# Patient Record
Sex: FEMALE | Race: WHITE | Employment: OTHER | URBAN - NONMETROPOLITAN AREA
[De-identification: names, ages, dates, MRNs, and addresses within clinical notes are randomized per-mention and may not be internally consistent; named-entity substitution may affect disease eponyms.]

---

## 2022-11-27 ENCOUNTER — HOSPITAL ENCOUNTER (EMERGENCY)
Age: 62
Discharge: HOME | End: 2022-11-27
Attending: EMERGENCY MEDICINE
Payer: COMMERCIAL

## 2022-11-27 VITALS
TEMPERATURE: 97.7 F | SYSTOLIC BLOOD PRESSURE: 124 MMHG | BODY MASS INDEX: 22.02 KG/M2 | HEIGHT: 64 IN | DIASTOLIC BLOOD PRESSURE: 62 MMHG | HEART RATE: 79 BPM | RESPIRATION RATE: 18 BRPM | WEIGHT: 129 LBS | OXYGEN SATURATION: 96 %

## 2022-11-27 DIAGNOSIS — S91.311A FOOT LACERATION, RIGHT, INITIAL ENCOUNTER: Primary | ICD-10-CM

## 2022-11-27 PROCEDURE — 90471 IMMUNIZATION ADMIN: CPT

## 2022-11-27 PROCEDURE — 75810000293 HC SIMP/SUPERF WND  RPR

## 2022-11-27 PROCEDURE — 90715 TDAP VACCINE 7 YRS/> IM: CPT | Performed by: EMERGENCY MEDICINE

## 2022-11-27 PROCEDURE — 74011250636 HC RX REV CODE- 250/636: Performed by: EMERGENCY MEDICINE

## 2022-11-27 PROCEDURE — 99284 EMERGENCY DEPT VISIT MOD MDM: CPT

## 2022-11-27 RX ORDER — CITALOPRAM 40 MG/1
40 TABLET, FILM COATED ORAL DAILY
COMMUNITY

## 2022-11-27 RX ORDER — ROSUVASTATIN CALCIUM 5 MG/1
5 TABLET, COATED ORAL
COMMUNITY

## 2022-11-27 RX ORDER — TRAMADOL HYDROCHLORIDE 50 MG/1
50 TABLET ORAL
COMMUNITY

## 2022-11-27 RX ADMIN — TETANUS TOXOID, REDUCED DIPHTHERIA TOXOID AND ACELLULAR PERTUSSIS VACCINE, ADSORBED 0.5 ML: 5; 2.5; 8; 8; 2.5 SUSPENSION INTRAMUSCULAR at 17:41

## 2022-11-27 NOTE — ED TRIAGE NOTES
Patient states laceration to right outer foot due to stepping on [employer] ornament. She denies tetanus being up to date.

## 2022-11-27 NOTE — DISCHARGE INSTRUCTIONS
Return for pain, redness/drainage, fever not resolving with motrin or tylenol, shortness of breath, vomiting, decreased fluid intake, weakness, numbness, dizziness, or any change or concerns.

## 2022-11-27 NOTE — ED PROVIDER NOTES
Pt c/o rt foot wound, says cut w metal star xmas decoration just pta. Bleeding resolved w pressure. No other injury. No fb per pt. Mild pain. Last tetanus > 10 yrs pta. Past Medical History:   Diagnosis Date    Arthritis     Rheumatoid arthritis ([address]        Past Surgical History:   Procedure Laterality Date    HX  SECTION      HX HYSTERECTOMY      HX ROTATOR CUFF REPAIR Right          History reviewed. No pertinent family history. Social History     Socioeconomic History    Marital status:      Spouse name: Not on file    Number of children: Not on file    Years of education: Not on file    Highest education level: Not on file   Occupational History    Not on file   Tobacco Use    Smoking status: Every Day     Types: Cigarettes    Smokeless tobacco: Never   Substance and Sexual Activity    Alcohol use: Yes    Drug use: Never    Sexual activity: Not on file   Other Topics Concern    Not on file   Social History Narrative    Not on file     Social Determinants of Health     Financial Resource Strain: Not on file   Food Insecurity: Not on file   Transportation Needs: Not on file   Physical Activity: Not on file   Stress: Not on file   Social Connections: Not on file   Intimate Partner Violence: Not on file   Housing Stability: Not on file         ALLERGIES: Bee venom protein (honey bee)    Review of Systems   Skin:  Positive for wound. Neurological:  Negative for weakness. All other systems reviewed and are negative. Vitals:    22 1709   BP: 124/62   Pulse: 79   Resp: 18   Temp: 97.7 °F (36.5 °C)   SpO2: 96%   Weight: 58.5 kg (129 lb)   Height: 5' 4\" (1.626 m)            Physical Exam  Vitals and nursing note reviewed. Constitutional:       Appearance: She is well-developed. She is not diaphoretic. HENT:      Head: Normocephalic and atraumatic. Eyes:      Conjunctiva/sclera: Conjunctivae normal.   Cardiovascular:      Rate and Rhythm: Normal rate and regular rhythm. Pulses: Normal pulses. Pulmonary:      Effort: Pulmonary effort is normal.      Breath sounds: No wheezing. Musculoskeletal:         General: Normal range of motion. Cervical back: Normal range of motion. Comments: + 1.5 cm linear open wound rt lat prox foot. No bleeding. No tension. No fb. From intact   Skin:     General: Skin is dry. Capillary Refill: Capillary refill takes less than 2 seconds. Findings: No rash. Neurological:      General: No focal deficit present. Mental Status: She is alert. Psychiatric:         Mood and Affect: Mood normal.        MDM         Wound Closure by Adhesive    Date/Time: 11/27/2022 6:14 PM  Performed by: Wallace Meléndez MD  Authorized by: Wallace Meléndez MD     Consent:     Consent obtained:  Verbal    Consent given by:  Patient    Risks discussed:  Poor cosmetic result, infection, pain and poor wound healing    Alternatives discussed: sutures.   Universal protocol:     Patient identity confirmed:  Verbally with patient  Anesthesia:     Anesthesia method:  None  Laceration details:     Location:  Foot    Foot location:  R heel    Length (cm):  1.5    Depth (mm):  3  Exploration:     Hemostasis achieved with:  Direct pressure    Wound exploration: wound explored through full range of motion and entire depth of wound visualized      Wound extent: no nerve damage noted and no tendon damage noted      Contaminated: no    Treatment:     Area cleansed with:  Saline    Amount of cleaning:  Standard    Irrigation solution:  Sterile saline    Irrigation volume:  200    Irrigation method:  Syringe    Debridement:  None  Skin repair:     Repair method:  Tissue adhesive  Approximation:     Approximation:  Close  Repair type:     Repair type:  Simple  Post-procedure details:     Procedure completion:  Tolerated well, no immediate complications      Vitals:  Patient Vitals for the past 12 hrs:   Temp Pulse Resp BP SpO2   11/27/22 1709 97.7 °F (36.5 °C) 79 18 124/62 96 %         Medications ordered:   Medications   diph,Pertuss(AC),Tet Vac-PF (BOOSTRIX) suspension 0.5 mL (0.5 mL IntraMUSCular Given 11/27/22 2504)         Lab findings:  No results found for this or any previous visit (from the past 12 hour(s)). X-Ray, CT or other radiology findings or impressions:  No orders to display             Progress notes, Consult notes or additional Procedure notes:   Offered dermabond vs sutures, pt agrees w dermabond, declines sutures. Declines imaging, says no fx. To close per d/w pt. Det ret inst given  no emc      Diagnosis:   1. Foot laceration, right, initial encounter        Disposition: home    Follow-up Information       Follow up With Specialties Details Why Contact [facility] EMERGENCY DEPT Emergency Medicine Go to  As needed, If symptoms worsen [facility] [address]    Missy Pelaez MD Family Medicine   [address] [facility] [address]  [address]  [phone]               Patient's Medications   Start Taking    No medications on file   Continue Taking    CITALOPRAM (CELEXA) 40 MG TABLET    Take 40 mg by mouth daily. ROSUVASTATIN (CRESTOR) 5 MG TABLET    Take 5 mg by mouth nightly. TRAMADOL (ULTRAM) 50 MG TABLET    Take 50 mg by mouth two (2) times daily as needed for Pain.    These Medications have changed    No medications on file   Stop Taking    No medications on file

## 2022-11-27 NOTE — ED NOTES
Non stick dressing applied over laceration. Dressing secured in place with coban. Patient voiced comfort with procedure.

## 2022-12-03 ENCOUNTER — HOSPITAL ENCOUNTER (EMERGENCY)
Age: 62
Discharge: HOME | End: 2022-12-03
Attending: EMERGENCY MEDICINE
Payer: COMMERCIAL

## 2022-12-03 VITALS
DIASTOLIC BLOOD PRESSURE: 82 MMHG | RESPIRATION RATE: 18 BRPM | OXYGEN SATURATION: 100 % | SYSTOLIC BLOOD PRESSURE: 175 MMHG | BODY MASS INDEX: 22.02 KG/M2 | TEMPERATURE: 97 F | HEART RATE: 89 BPM | HEIGHT: 64 IN | WEIGHT: 129 LBS

## 2022-12-03 DIAGNOSIS — T14.8XXD DELAYED WOUND HEALING: Primary | ICD-10-CM

## 2022-12-03 DIAGNOSIS — L03.115 CELLULITIS OF RIGHT LOWER EXTREMITY: ICD-10-CM

## 2022-12-03 PROCEDURE — 99283 EMERGENCY DEPT VISIT LOW MDM: CPT

## 2022-12-03 PROCEDURE — 74011250637 HC RX REV CODE- 250/637: Performed by: EMERGENCY MEDICINE

## 2022-12-03 PROCEDURE — 74011000250 HC RX REV CODE- 250: Performed by: EMERGENCY MEDICINE

## 2022-12-03 RX ORDER — BACITRACIN 500 UNIT/G
1 PACKET (EA) TOPICAL
Status: COMPLETED | OUTPATIENT
Start: 2022-12-03 | End: 2022-12-03

## 2022-12-03 RX ORDER — ASCORBIC ACID
GRANULES (GRAM) ORAL
COMMUNITY

## 2022-12-03 RX ORDER — TRAZODONE HYDROCHLORIDE 50 MG/1
1 TABLET ORAL
COMMUNITY

## 2022-12-03 RX ORDER — CEPHALEXIN 250 MG/1
500 CAPSULE ORAL
Status: COMPLETED | OUTPATIENT
Start: 2022-12-03 | End: 2022-12-03

## 2022-12-03 RX ORDER — CEPHALEXIN 500 MG/1
500 CAPSULE ORAL 4 TIMES DAILY
Qty: 28 CAPSULE | Refills: 0 | Status: SHIPPED | OUTPATIENT
Start: 2022-12-03 | End: 2022-12-10

## 2022-12-03 RX ORDER — BISACODYL 5 MG/1
TABLET, COATED ORAL
COMMUNITY

## 2022-12-03 RX ADMIN — CEPHALEXIN 500 MG: 250 CAPSULE ORAL at 16:15

## 2022-12-03 RX ADMIN — BACITRACIN 1 PACKET: 500 OINTMENT TOPICAL at 16:14

## 2022-12-03 NOTE — ED PROVIDER NOTES
EMERGENCY DEPARTMENT HISTORY AND PHYSICAL EXAM      Date: 12/3/2022  Patient Name: Christiano Chambers    History of Presenting Illness     Chief Complaint   Patient presents with    Wound Check       History Provided By: Patient    HPI: Christiano Chambers, 65 y.o. female presenting to for evaluation of a laceration to right ankle. Seen and repaired with dermabond 1 week ago but states the dermabond came off the day after repair. Now notes swelling and redness. No numbness. Nmo weakness. No fevers or chills. There are no other complaints, changes, or physical findings at this time. Past History     Past Medical History:  Past Medical History:   Diagnosis Date    Arthritis     Rheumatoid arthritis ([facility] [address]        Past Surgical History:  Past Surgical History:   Procedure Laterality Date    HX  SECTION      HX HYSTERECTOMY      HX ROTATOR CUFF REPAIR Right        Family History:  History reviewed. No pertinent family history. Social History:  Social History     Tobacco Use    Smoking status: Every Day     Types: Cigarettes    Smokeless tobacco: Never   Substance Use Topics    Alcohol use: Yes    Drug use: Never       Allergies: Allergies   Allergen Reactions    Bee Venom Protein (Honey Bee) Anaphylaxis       PCP: Kim Vargas MD    No current facility-administered medications on file prior to encounter. Current Outpatient Medications on File Prior to Encounter   Medication Sig Dispense Refill    multivitamins-minerals-lutein (Multivitamin 50 Plus) tab tablet Take  by mouth. Ascorbic Acid gran Take  by mouth. traZODone (DESYREL) 50 mg tablet Take 1 Tablet by mouth nightly. citalopram (CeleXA) 40 mg tablet Take 40 mg by mouth daily. rosuvastatin (Crestor) 5 mg tablet Take 5 mg by mouth nightly. traMADoL (ULTRAM) 50 mg tablet Take 50 mg by mouth two (2) times daily as needed for Pain. Review of Systems   Review of Systems   Constitutional: Negative.   Negative for activity change. Musculoskeletal:  Positive for myalgias. Negative for arthralgias. Skin:  Positive for wound. Neurological:  Negative for weakness and numbness. All other systems reviewed and are negative. Physical Exam   Physical Exam  Vitals and nursing note reviewed. Constitutional:       Appearance: Normal appearance. HENT:      Head: Normocephalic and atraumatic. Nose: Nose normal.   Eyes:      Conjunctiva/sclera: Conjunctivae normal.   Pulmonary:      Effort: Pulmonary effort is normal.   Musculoskeletal:         General: Normal range of motion. Feet:       Comments: 1.5cm open wound to right lateral ankle. Swelling mild erythmea. Normal sensation. Normal dp and pt pulses. Normal achilles. Neurological:      Mental Status: She is alert. Mental status is at baseline. Psychiatric:         Mood and Affect: Mood normal.         Thought Content: Thought content normal.            Medical Decision Making and ED Course   Differential Diagnosis & Medical Decision Making Provider Note:     Recent laceration, has wound /ulcer that is healing. >48 hours since wound. Unable to close but will cover w abx given early cellulitis. - I am the first provider for this patient. I reviewed the vital signs, available nursing notes, past medical history, past surgical history, family history and social history. The patients presenting problems have been discussed, and they are in agreement with the care plan formulated and outlined with them. I have encouraged them to ask questions as they arise throughout their visit. Vital Signs-Reviewed the patient's vital signs. Patient Vitals for the past 12 hrs:   Temp Pulse Resp BP SpO2   12/03/22 1628 -- -- -- -- 100 %   12/03/22 1539 97 °F (36.1 °C) 89 18 (!) 175/82 99 %       ED Course:           Disposition   Disposition: Condition stable  DC- Adult Discharges: All of the diagnostic tests were reviewed and questions answered.  Diagnosis, care plan and treatment options were discussed. The patient understands the instructions and will follow up as directed. The patients results have been reviewed with them. They have been counseled regarding their diagnosis. The patient verbally convey understanding and agreement of the signs, symptoms, diagnosis, treatment and prognosis and additionally agrees to follow up as recommended with their PCP in 24 - 48 hours. They also agree with the care-plan and convey that all of their questions have been answered. I have also put together some discharge instructions for them that include: 1) educational information regarding their diagnosis, 2) how to care for their diagnosis at home, as well a 3) list of reasons why they would want to return to the ED prior to their follow-up appointment, should their condition change. DC-The patient was given verbal fever treatment  and follow-up instructions  DC- Pain Control DC Home plan: Nonsteroidals and Tylenol     DISCHARGE PLAN:  1. Current Discharge Medication List        START taking these medications    Details   cephALEXin (Keflex) 500 mg capsule Take 1 Capsule by mouth four (4) times daily for 7 days. Qty: 28 Capsule, Refills: 0           CONTINUE these medications which have NOT CHANGED    Details   multivitamins-minerals-lutein (Multivitamin 50 Plus) tab tablet Take  by mouth. Ascorbic Acid gran Take  by mouth. traZODone (DESYREL) 50 mg tablet Take 1 Tablet by mouth nightly. citalopram (CeleXA) 40 mg tablet Take 40 mg by mouth daily. rosuvastatin (Crestor) 5 mg tablet Take 5 mg by mouth nightly. traMADoL (ULTRAM) 50 mg tablet Take 50 mg by mouth two (2) times daily as needed for Pain.            2.   Follow-up Information       Follow up With Specialties Details Why Contact Info    Miranda De Jesus MD Family Medicine Schedule an appointment as soon as possible for a visit in 3 days For followup and recheck of todays symptoms [facility] [address] [facility]  [phone]      [facility] EMERGENCY DEPT Emergency Medicine Go to  As needed, or for any concerns or deteriorations. , if symptoms persist or worsen. [address]  [phone]          3. Return to ED if worse   4. Current Discharge Medication List        START taking these medications    Details   cephALEXin (Keflex) 500 mg capsule Take 1 Capsule by mouth four (4) times daily for 7 days. Qty: 28 Capsule, Refills: 0  Start date: 12/3/2022, End date: 12/10/2022              Diagnosis/Clinical Impression     Clinical Impression:   1. Delayed wound healing    2. Cellulitis of right lower extremity        Attestations: Shakira Rangel MD, am the primary clinician of record. Please note that this dictation was completed with Bench, the [employer] voice recognition software. Quite often unanticipated grammatical, syntax, homophones, and other interpretive errors are inadvertently transcribed by the computer software. Please disregard these errors. Please excuse any errors that have escaped final proofreading. Thank you.

## 2022-12-03 NOTE — ED TRIAGE NOTES
Was here 6 days ago and states that she had a laceration - states that she did not get sutures but got glued - feels like it is infected now. States that it is \"yuck\" now and has increased pain.

## 2022-12-26 ENCOUNTER — HOSPITAL ENCOUNTER (EMERGENCY)
Age: 62
Discharge: HOME | End: 2022-12-26
Attending: EMERGENCY MEDICINE
Payer: COMMERCIAL

## 2022-12-26 ENCOUNTER — APPOINTMENT (OUTPATIENT)
Dept: GENERAL RADIOLOGY | Age: 62
End: 2022-12-26
Attending: EMERGENCY MEDICINE
Payer: COMMERCIAL

## 2022-12-26 VITALS
RESPIRATION RATE: 116 BRPM | OXYGEN SATURATION: 99 % | BODY MASS INDEX: 22.02 KG/M2 | WEIGHT: 129 LBS | DIASTOLIC BLOOD PRESSURE: 67 MMHG | SYSTOLIC BLOOD PRESSURE: 140 MMHG | HEART RATE: 63 BPM | TEMPERATURE: 97.3 F | HEIGHT: 64 IN

## 2022-12-26 DIAGNOSIS — S20.211A CONTUSION OF RIGHT CHEST WALL, INITIAL ENCOUNTER: Primary | ICD-10-CM

## 2022-12-26 LAB
ALBUMIN SERPL-MCNC: 3.9 G/DL (ref 3.4–5)
ALBUMIN/GLOB SERPL: 1.2 {RATIO} (ref 0.8–1.7)
ALP SERPL-CCNC: 65 U/L (ref 45–117)
ALT SERPL-CCNC: 30 U/L (ref 13–56)
ANION GAP SERPL CALC-SCNC: 7 MMOL/L (ref 3–18)
AST SERPL W P-5'-P-CCNC: 15 U/L (ref 10–38)
ATRIAL RATE: 64 BPM
BASOPHILS # BLD: 0 K/UL (ref 0–0.1)
BASOPHILS NFR BLD: 0 % (ref 0–2)
BILIRUB SERPL-MCNC: 0.5 MG/DL (ref 0.2–1)
BUN SERPL-MCNC: 21 MG/DL (ref 7–18)
BUN/CREAT SERPL: 27 (ref 12–20)
CA-I BLD-MCNC: 9.2 MG/DL (ref 8.5–10.1)
CALCULATED P AXIS, ECG09: 37 DEGREES
CALCULATED R AXIS, ECG10: -2 DEGREES
CALCULATED T AXIS, ECG11: -17 DEGREES
CHLORIDE SERPL-SCNC: 105 MMOL/L (ref 100–111)
CO2 SERPL-SCNC: 29 MMOL/L (ref 21–32)
CREAT SERPL-MCNC: 0.79 MG/DL (ref 0.6–1.3)
DIAGNOSIS, 93000: NORMAL
DIFFERENTIAL METHOD BLD: NORMAL
EOSINOPHIL # BLD: 0.2 K/UL (ref 0–0.4)
EOSINOPHIL NFR BLD: 3 % (ref 0–5)
ERYTHROCYTE [DISTWIDTH] IN BLOOD BY AUTOMATED COUNT: 12.7 % (ref 11.6–14.5)
GLOBULIN SER CALC-MCNC: 3.2 G/DL (ref 2–4)
GLUCOSE SERPL-MCNC: 97 MG/DL (ref 74–99)
HCT VFR BLD AUTO: 41.7 % (ref 35–45)
HGB BLD-MCNC: 13.4 G/DL (ref 12–16)
IMM GRANULOCYTES # BLD AUTO: 0 K/UL (ref 0–0.04)
IMM GRANULOCYTES NFR BLD AUTO: 0 % (ref 0–0.5)
INR PPP: 0.9 (ref 0.8–1.2)
LYMPHOCYTES # BLD: 2.2 K/UL (ref 0.9–3.6)
LYMPHOCYTES NFR BLD: 31 % (ref 21–52)
MCH RBC QN AUTO: 29.8 PG (ref 24–34)
MCHC RBC AUTO-ENTMCNC: 32.1 G/DL (ref 31–37)
MCV RBC AUTO: 92.7 FL (ref 78–100)
MONOCYTES # BLD: 0.4 K/UL (ref 0.05–1.2)
MONOCYTES NFR BLD: 6 % (ref 3–10)
NEUTS SEG # BLD: 4.1 K/UL (ref 1.8–8)
NEUTS SEG NFR BLD: 60 % (ref 40–73)
NRBC # BLD: 0 K/UL (ref 0–0.01)
NRBC BLD-RTO: 0 PER 100 WBC
P-R INTERVAL, ECG05: 179 MS
PLATELET # BLD AUTO: 262 K/UL (ref 135–420)
PMV BLD AUTO: 9.4 FL (ref 9.2–11.8)
POTASSIUM SERPL-SCNC: 4 MMOL/L (ref 3.5–5.5)
PROT SERPL-MCNC: 7.1 G/DL (ref 6.4–8.2)
PROTHROMBIN TIME: 13 SEC (ref 11.5–15.2)
Q-T INTERVAL, ECG07: 434 MS
QRS DURATION, ECG06: 97 MS
QTC CALCULATION (BEZET), ECG08: 445 MS
RBC # BLD AUTO: 4.5 M/UL (ref 4.2–5.3)
SODIUM SERPL-SCNC: 141 MMOL/L (ref 136–145)
VENTRICULAR RATE, ECG03: 63 BPM
WBC # BLD AUTO: 6.9 K/UL (ref 4.6–13.2)

## 2022-12-26 PROCEDURE — 96374 THER/PROPH/DIAG INJ IV PUSH: CPT

## 2022-12-26 PROCEDURE — 85025 COMPLETE CBC W/AUTO DIFF WBC: CPT

## 2022-12-26 PROCEDURE — 85610 PROTHROMBIN TIME: CPT

## 2022-12-26 PROCEDURE — 80053 COMPREHEN METABOLIC PANEL: CPT

## 2022-12-26 PROCEDURE — 74011250636 HC RX REV CODE- 250/636: Performed by: EMERGENCY MEDICINE

## 2022-12-26 PROCEDURE — 96375 TX/PRO/DX INJ NEW DRUG ADDON: CPT

## 2022-12-26 PROCEDURE — 71101 X-RAY EXAM UNILAT RIBS/CHEST: CPT

## 2022-12-26 PROCEDURE — 99285 EMERGENCY DEPT VISIT HI MDM: CPT

## 2022-12-26 PROCEDURE — 93005 ELECTROCARDIOGRAM TRACING: CPT

## 2022-12-26 RX ORDER — TRAMADOL HYDROCHLORIDE 50 MG/1
50 TABLET ORAL
Qty: 10 TABLET | Refills: 0 | Status: SHIPPED | OUTPATIENT
Start: 2022-12-26 | End: 2022-12-29

## 2022-12-26 RX ORDER — SODIUM CHLORIDE 0.9 % (FLUSH) 0.9 %
5-40 SYRINGE (ML) INJECTION EVERY 8 HOURS
Status: DISCONTINUED | OUTPATIENT
Start: 2022-12-26 | End: 2022-12-26 | Stop reason: HOSPADM

## 2022-12-26 RX ORDER — MORPHINE SULFATE 4 MG/ML
4 INJECTION, SOLUTION INTRAMUSCULAR; INTRAVENOUS
Status: COMPLETED | OUTPATIENT
Start: 2022-12-26 | End: 2022-12-26

## 2022-12-26 RX ORDER — NAPROXEN 500 MG/1
500 TABLET ORAL
Qty: 20 TABLET | Refills: 0 | Status: SHIPPED | OUTPATIENT
Start: 2022-12-26

## 2022-12-26 RX ORDER — ONDANSETRON 2 MG/ML
4 INJECTION INTRAMUSCULAR; INTRAVENOUS
Status: COMPLETED | OUTPATIENT
Start: 2022-12-26 | End: 2022-12-26

## 2022-12-26 RX ORDER — SODIUM CHLORIDE 0.9 % (FLUSH) 0.9 %
5-40 SYRINGE (ML) INJECTION AS NEEDED
Status: DISCONTINUED | OUTPATIENT
Start: 2022-12-26 | End: 2022-12-26 | Stop reason: HOSPADM

## 2022-12-26 RX ADMIN — MORPHINE SULFATE 4 MG: 4 INJECTION, SOLUTION INTRAMUSCULAR; INTRAVENOUS at 12:34

## 2022-12-26 RX ADMIN — SODIUM CHLORIDE 1000 ML: 9 INJECTION, SOLUTION INTRAVENOUS at 12:20

## 2022-12-26 RX ADMIN — ONDANSETRON 4 MG: 2 INJECTION INTRAMUSCULAR; INTRAVENOUS at 12:34

## 2022-12-26 NOTE — ED PROVIDER NOTES
EMERGENCY DEPARTMENT HISTORY AND PHYSICAL EXAM      Date: 2022  Patient Name: Frances Grady    History of Presenting Illness     Chief Complaint   Patient presents with    Fall       History Provided By: Patient and Patient's Daughter    HPI: Frances Grady, 65 y.o. female with known past medical history significant for rheumatoid arthritis presents with dizzy spells over the past several weeks leading to a fall last night where she hit the right side of her ribs. There are no exacerbating or relieving factors for the dizzy spells and she has no explanation has not seen a physician for them. She is accompanied by her daughter conveys that they have treated the right rib pain with over-the-counter remedies without complete relief of her symptoms. She denies any loss of consciousness and is not on a blood thinning medication    There are no other complaints, changes, or physical findings at this time. Past History   Past Medical History:  Past Medical History:   Diagnosis Date    Arthritis     Rheumatoid arthritis ([address]        Past Surgical History:  Past Surgical History:   Procedure Laterality Date    HX  SECTION      HX HYSTERECTOMY      HX ROTATOR CUFF REPAIR Right        Family History:  History reviewed. No pertinent family history. Social History:  Social History     Tobacco Use    Smoking status: Every Day     Types: Cigarettes    Smokeless tobacco: Never   Substance Use Topics    Alcohol use: Yes    Drug use: Never       Allergies:   Allergies   Allergen Reactions    Bee Venom Protein (Honey Bee) Anaphylaxis       PCP: Zacarias Parr MD    Current Facility-Administered Medications   Medication Dose Route Frequency Provider Last Rate Last Admin    sodium chloride (NS) flush 5-40 mL  5-40 mL IntraVENous Q8H Carlos ELIZABETH MD        sodium chloride (NS) flush 5-40 mL  5-40 mL IntraVENous PRN Mayito Hogan MD         Current Outpatient Medications   Medication Sig Dispense Refill    traMADoL (ULTRAM) 50 mg tablet Take 1 Tablet by mouth every six (6) hours as needed for Pain for up to 3 days. Max Daily Amount: 200 mg. Indications: pain 10 Tablet 0    naproxen (NAPROSYN) 500 mg tablet Take 1 Tablet by mouth every twelve (12) hours as needed for Pain. 20 Tablet 0    traZODone (DESYREL) 50 mg tablet Take 1 Tablet by mouth nightly. citalopram (CELEXA) 40 mg tablet Take 40 mg by mouth daily. rosuvastatin (CRESTOR) 5 mg tablet Take 5 mg by mouth nightly. Review of Systems   Review of Systems   Constitutional: Negative. Negative for appetite change, chills, fatigue and fever. HENT: Negative. Negative for congestion and sinus pain. Eyes: Negative. Negative for pain and visual disturbance. Respiratory:  Positive for chest tightness. Negative for shortness of breath. Cardiovascular:  Positive for chest pain. Gastrointestinal: Negative. Negative for abdominal pain, diarrhea, nausea and vomiting. Genitourinary: Negative. Negative for difficulty urinating. No discharge   Musculoskeletal:  Positive for back pain. Negative for arthralgias. Skin: Negative. Negative for rash. Neurological: Negative. Negative for weakness and headaches. Hematological: Negative. Psychiatric/Behavioral: Negative. Negative for agitation. The patient is not nervous/anxious. All other systems reviewed and are negative. Physical Exam   Physical Exam  Vitals and nursing note reviewed. Constitutional:       General: She is not in acute distress. Appearance: She is well-developed. HENT:      Head: Normocephalic and atraumatic. Nose: Nose normal.      Mouth/Throat:      Mouth: Mucous membranes are moist.      Pharynx: Oropharynx is clear. No oropharyngeal exudate. Eyes:      General:         Right eye: No discharge. Left eye: No discharge. Conjunctiva/sclera: Conjunctivae normal.      Pupils: Pupils are equal, round, and reactive to light. Cardiovascular:      Rate and Rhythm: Normal rate and regular rhythm. Chest Wall: PMI is not displaced. No thrill. Heart sounds: Normal heart sounds. No murmur heard. No friction rub. No gallop. Pulmonary:      Effort: Pulmonary effort is normal. No respiratory distress. Breath sounds: Normal breath sounds. No wheezing or rales. Chest:      Chest wall: No tenderness. Abdominal:      General: Bowel sounds are normal. There is no distension. Palpations: Abdomen is soft. There is no mass. Tenderness: There is no abdominal tenderness. There is no guarding or rebound. Musculoskeletal:         General: Normal range of motion. Cervical back: Normal range of motion and neck supple. Comments: Tender to palpation over right mid axillary line rib cage as well as right mid scapular line rib pain   Lymphadenopathy:      Cervical: No cervical adenopathy. Skin:     General: Skin is warm and dry. Capillary Refill: Capillary refill takes less than 2 seconds. Findings: No erythema or rash. Neurological:      Mental Status: She is alert and oriented to person, place, and time. Cranial Nerves: No cranial nerve deficit. Coordination: Coordination normal.   Psychiatric:         Mood and Affect: Mood normal.         Behavior: Behavior normal.       Lab and Diagnostic Study Results   Labs -     Recent Results (from the past 12 hour(s))   CBC WITH AUTOMATED DIFF    Collection Time: 12/26/22 12:25 PM   Result Value Ref Range    WBC 6.9 4.6 - 13.2 K/uL    RBC 4.50 4. 20 - 5.30 M/uL    HGB 13.4 12.0 - 16.0 g/dL    HCT 41.7 35.0 - 45.0 %    MCV 92.7 78.0 - 100.0 FL    MCH 29.8 24.0 - 34.0 PG    MCHC 32.1 31.0 - 37.0 g/dL    RDW 12.7 11.6 - 14.5 %    PLATELET 923 271 - 897 K/uL    MPV 9.4 9.2 - 11.8 FL    NRBC 0.0 0.0  WBC    ABSOLUTE NRBC 0.00 0.00 - 0.01 K/uL    NEUTROPHILS 60 40 - 73 %    LYMPHOCYTES 31 21 - 52 %    MONOCYTES 6 3 - 10 %    EOSINOPHILS 3 0 - 5 % BASOPHILS 0 0 - 2 %    IMMATURE GRANULOCYTES 0 0 - 0.5 %    ABS. NEUTROPHILS 4.1 1.8 - 8.0 K/UL    ABS. LYMPHOCYTES 2.2 0.9 - 3.6 K/UL    ABS. MONOCYTES 0.4 0.05 - 1.2 K/UL    ABS. EOSINOPHILS 0.2 0.0 - 0.4 K/UL    ABS. BASOPHILS 0.0 0.0 - 0.1 K/UL    ABS. IMM. GRANS. 0.0 0.00 - 0.04 K/UL    DF AUTOMATED     METABOLIC PANEL, COMPREHENSIVE    Collection Time: 12/26/22 12:25 PM   Result Value Ref Range    Sodium 141 136 - 145 mmol/L    Potassium 4.0 3.5 - 5.5 mmol/L    Chloride 105 100 - 111 mmol/L    CO2 29 21 - 32 mmol/L    Anion gap 7 3.0 - 18.0 mmol/L    Glucose 97 74 - 99 mg/dL    BUN 21 (H) 7 - 18 mg/dL    Creatinine 0.79 0.60 - 1.30 mg/dL    BUN/Creatinine ratio 27 (H) 12 - 20      eGFR >60 >60 ml/min/1.73m2    Calcium 9.2 8.5 - 10.1 mg/dL    Bilirubin, total 0.5 0.2 - 1.0 mg/dL    AST (SGOT) 15 10 - 38 U/L    ALT (SGPT) 30 13 - 56 U/L    Alk. phosphatase 65 45 - 117 U/L    Protein, total 7.1 6.4 - 8.2 g/dL    Albumin 3.9 3.4 - 5.0 g/dL    Globulin 3.2 2.0 - 4.0 g/dL    A-G Ratio 1.2 0.8 - 1.7     PROTHROMBIN TIME + INR    Collection Time: 12/26/22 12:25 PM   Result Value Ref Range    Prothrombin time 13.0 11.5 - 15.2 sec    INR 0.9 0.8 - 1.2         Radiologic Studies -   [unfilled]  CT Results  (Last 48 hours)      None          CXR Results  (Last 48 hours)      None            Medical Decision Making and ED Course   Differential Diagnosis & Medical Decision Making Provider Note:   Dizzy spells could likely be ACS, arrhythmia, dehydration, electrolyte abnormality, UTI. Patient is tachycardic at this time likely secondary to pain. Will treat with analgesic and assess with basic and cardiac labs and treat with IV fluid. Will perform orthostatic vitals and assess UA. Additionally will perform right rib series for diagnostics for acute chest pathology. - I am the first and primary provider for this patient.  I reviewed the vital signs, available nursing notes, past medical history, past surgical history, family history and social history. The patient's presenting problems have been discussed, and the staff are in agreement with the care plan formulated and outlined with them. I have encouraged them to ask questions as they arise throughout their visit. Vital Signs-Reviewed the patient's vital signs. Patient Vitals for the past 12 hrs:   Temp Pulse Resp BP SpO2   12/26/22 1250 -- 63 -- (!) 140/67 --   12/26/22 1250 -- 63 -- (!) 147/80 --   12/26/22 1249 -- 60 -- 139/73 --   12/26/22 1158 -- -- -- (!) 151/77 --   12/26/22 1152 97.3 °F (36.3 °C) 72 (!) 116 -- 99 %       EKG interpretation: (Preliminary): EKG Interpreted by me. Shows rate 63.  179 ms, QRS 79 ms,  ms. Interpretation: Sinus rhythm. ED Course:   No evidence of acute fracture or pulmonary contusion. No evidence for reasons why the patient would be passing out. Procedures and Critical Care     Disposition   Disposition: Condition stable    DISCHARGE PLAN:  1. Current Discharge Medication List        CONTINUE these medications which have NOT CHANGED    Details   traZODone (DESYREL) 50 mg tablet Take 1 Tablet by mouth nightly. citalopram (CELEXA) 40 mg tablet Take 40 mg by mouth daily. rosuvastatin (CRESTOR) 5 mg tablet Take 5 mg by mouth nightly. traMADoL (ULTRAM) 50 mg tablet Take 50 mg by mouth two (2) times daily as needed for Pain. 2.   Follow-up Information       Follow up With Specialties Details Why Contact Info    Tavon Pardo MD Family Medicine Call in 2 days  [address] [facility]  [phone]            3. Return to ED if worse   4. Current Discharge Medication List        START taking these medications    Details   traMADoL (ULTRAM) 50 mg tablet Take 1 Tablet by mouth every six (6) hours as needed for Pain for up to 3 days. Max Daily Amount: 200 mg.  Indications: pain  Qty: 10 Tablet, Refills: 0  Start date: 12/26/2022, End date: 12/29/2022    Associated Diagnoses: Contusion of right chest wall, initial encounter      naproxen (NAPROSYN) 500 mg tablet Take 1 Tablet by mouth every twelve (12) hours as needed for Pain. Qty: 20 Tablet, Refills: 0  Start date: 12/26/2022           Remove if admitted/transferred    Diagnosis/Clinical Impression     Clinical Impression:   1. Contusion of right chest wall, initial encounter        Attestations: Joo Shine MD, am the primary clinician of record. Please note that this dictation was completed with [employer], the [employer] voice recognition software. Quite often unanticipated grammatical, syntax, homophones, and other interpretive errors are inadvertently transcribed by the computer software. Please disregard these errors. Please excuse any errors that have escaped final proofreading. Thank you.

## 2023-09-09 ENCOUNTER — APPOINTMENT (OUTPATIENT)
Age: 63
End: 2023-09-09
Payer: COMMERCIAL

## 2023-09-09 ENCOUNTER — HOSPITAL ENCOUNTER (EMERGENCY)
Age: 63
Discharge: ANOTHER HOSPITAL | End: 2023-09-09
Attending: EMERGENCY MEDICINE
Payer: COMMERCIAL

## 2023-09-09 VITALS
SYSTOLIC BLOOD PRESSURE: 126 MMHG | WEIGHT: 141 LBS | DIASTOLIC BLOOD PRESSURE: 73 MMHG | HEART RATE: 71 BPM | TEMPERATURE: 98.3 F | BODY MASS INDEX: 24.2 KG/M2 | RESPIRATION RATE: 20 BRPM | OXYGEN SATURATION: 100 %

## 2023-09-09 DIAGNOSIS — S22.41XA CLOSED FRACTURE OF MULTIPLE RIBS OF RIGHT SIDE, INITIAL ENCOUNTER: ICD-10-CM

## 2023-09-09 DIAGNOSIS — S27.0XXA TRAUMATIC PNEUMOTHORAX, INITIAL ENCOUNTER: Primary | ICD-10-CM

## 2023-09-09 DIAGNOSIS — T79.7XXA TRAUMATIC SUBCUTANEOUS EMPHYSEMA, INITIAL ENCOUNTER (HCC): ICD-10-CM

## 2023-09-09 LAB
ALBUMIN SERPL-MCNC: 3.8 G/DL (ref 3.4–5)
ALBUMIN/GLOB SERPL: 1.4 (ref 0.8–1.7)
ALP SERPL-CCNC: 59 U/L (ref 45–117)
ALT SERPL-CCNC: 37 U/L (ref 13–56)
ANION GAP SERPL CALC-SCNC: 6 MMOL/L (ref 3–18)
AST SERPL W P-5'-P-CCNC: 22 U/L (ref 10–38)
BASOPHILS # BLD: 0 K/UL (ref 0–0.1)
BASOPHILS NFR BLD: 0 % (ref 0–2)
BILIRUB SERPL-MCNC: 0.3 MG/DL (ref 0.2–1)
BUN SERPL-MCNC: 11 MG/DL (ref 7–18)
BUN/CREAT SERPL: 16 (ref 12–20)
CA-I BLD-MCNC: 7.8 MG/DL (ref 8.5–10.1)
CHLORIDE SERPL-SCNC: 106 MMOL/L (ref 100–111)
CO2 SERPL-SCNC: 28 MMOL/L (ref 21–32)
CREAT SERPL-MCNC: 0.67 MG/DL (ref 0.6–1.3)
DIFFERENTIAL METHOD BLD: ABNORMAL
EOSINOPHIL # BLD: 0 K/UL (ref 0–0.4)
EOSINOPHIL NFR BLD: 0 % (ref 0–5)
ERYTHROCYTE [DISTWIDTH] IN BLOOD BY AUTOMATED COUNT: 12.7 % (ref 11.6–14.5)
ETHANOL SERPL-MCNC: 145 MG/DL (ref 0–3)
GLOBULIN SER CALC-MCNC: 2.8 G/DL (ref 2–4)
GLUCOSE SERPL-MCNC: 99 MG/DL (ref 74–99)
HCT VFR BLD AUTO: 38.8 % (ref 35–45)
HGB BLD-MCNC: 12.8 G/DL (ref 12–16)
IMM GRANULOCYTES # BLD AUTO: 0.1 K/UL (ref 0–0.04)
IMM GRANULOCYTES NFR BLD AUTO: 0 % (ref 0–0.5)
LYMPHOCYTES # BLD: 1.5 K/UL (ref 0.9–3.6)
LYMPHOCYTES NFR BLD: 12 % (ref 21–52)
MCH RBC QN AUTO: 30.3 PG (ref 24–34)
MCHC RBC AUTO-ENTMCNC: 33 G/DL (ref 31–37)
MCV RBC AUTO: 91.7 FL (ref 78–100)
MONOCYTES # BLD: 0.4 K/UL (ref 0.05–1.2)
MONOCYTES NFR BLD: 4 % (ref 3–10)
NEUTS SEG # BLD: 10.5 K/UL (ref 1.8–8)
NEUTS SEG NFR BLD: 84 % (ref 40–73)
NRBC # BLD: 0 K/UL (ref 0–0.01)
NRBC BLD-RTO: 0 PER 100 WBC
PLATELET # BLD AUTO: 226 K/UL (ref 135–420)
PMV BLD AUTO: 9.6 FL (ref 9.2–11.8)
POTASSIUM SERPL-SCNC: 3.6 MMOL/L (ref 3.5–5.5)
PROT SERPL-MCNC: 6.6 G/DL (ref 6.4–8.2)
RBC # BLD AUTO: 4.23 M/UL (ref 4.2–5.3)
SODIUM SERPL-SCNC: 140 MMOL/L (ref 136–145)
WBC # BLD AUTO: 12.6 K/UL (ref 4.6–13.2)

## 2023-09-09 PROCEDURE — 99285 EMERGENCY DEPT VISIT HI MDM: CPT

## 2023-09-09 PROCEDURE — 71250 CT THORAX DX C-: CPT

## 2023-09-09 PROCEDURE — 36415 COLL VENOUS BLD VENIPUNCTURE: CPT

## 2023-09-09 PROCEDURE — 6360000002 HC RX W HCPCS: Performed by: EMERGENCY MEDICINE

## 2023-09-09 PROCEDURE — 96375 TX/PRO/DX INJ NEW DRUG ADDON: CPT

## 2023-09-09 PROCEDURE — 2580000003 HC RX 258: Performed by: EMERGENCY MEDICINE

## 2023-09-09 PROCEDURE — 2500000003 HC RX 250 WO HCPCS: Performed by: EMERGENCY MEDICINE

## 2023-09-09 PROCEDURE — 82077 ASSAY SPEC XCP UR&BREATH IA: CPT

## 2023-09-09 PROCEDURE — 80053 COMPREHEN METABOLIC PANEL: CPT

## 2023-09-09 PROCEDURE — 96374 THER/PROPH/DIAG INJ IV PUSH: CPT

## 2023-09-09 PROCEDURE — 85025 COMPLETE CBC W/AUTO DIFF WBC: CPT

## 2023-09-09 RX ORDER — MORPHINE SULFATE 4 MG/ML
4 INJECTION, SOLUTION INTRAMUSCULAR; INTRAVENOUS
Status: COMPLETED | OUTPATIENT
Start: 2023-09-09 | End: 2023-09-09

## 2023-09-09 RX ORDER — 0.9 % SODIUM CHLORIDE 0.9 %
1000 INTRAVENOUS SOLUTION INTRAVENOUS ONCE
Status: COMPLETED | OUTPATIENT
Start: 2023-09-09 | End: 2023-09-09

## 2023-09-09 RX ORDER — ONDANSETRON 2 MG/ML
4 INJECTION INTRAMUSCULAR; INTRAVENOUS ONCE
Status: COMPLETED | OUTPATIENT
Start: 2023-09-09 | End: 2023-09-09

## 2023-09-09 RX ORDER — HYDROMORPHONE HYDROCHLORIDE 1 MG/ML
1 INJECTION, SOLUTION INTRAMUSCULAR; INTRAVENOUS; SUBCUTANEOUS
Status: COMPLETED | OUTPATIENT
Start: 2023-09-09 | End: 2023-09-09

## 2023-09-09 RX ADMIN — ONDANSETRON 4 MG: 2 INJECTION INTRAMUSCULAR; INTRAVENOUS at 02:53

## 2023-09-09 RX ADMIN — SODIUM CHLORIDE 1000 ML: 9 INJECTION, SOLUTION INTRAVENOUS at 02:03

## 2023-09-09 RX ADMIN — MORPHINE SULFATE 4 MG: 4 INJECTION, SOLUTION INTRAMUSCULAR; INTRAVENOUS at 02:03

## 2023-09-09 RX ADMIN — HYDROMORPHONE HYDROCHLORIDE 1 MG: 1 INJECTION, SOLUTION INTRAMUSCULAR; INTRAVENOUS; SUBCUTANEOUS at 02:54

## 2023-09-09 RX ADMIN — HYDROMORPHONE HYDROCHLORIDE 1 MG: 1 INJECTION, SOLUTION INTRAMUSCULAR; INTRAVENOUS; SUBCUTANEOUS at 04:24

## 2023-09-09 ASSESSMENT — PAIN SCALES - GENERAL: PAINLEVEL_OUTOF10: 6

## 2023-09-09 ASSESSMENT — PAIN DESCRIPTION - DESCRIPTORS: DESCRIPTORS: ACHING

## 2023-09-09 ASSESSMENT — PAIN DESCRIPTION - LOCATION: LOCATION: CHEST;HEAD

## 2023-09-09 NOTE — ED NOTES
Pt arrived at [facility]. See Downtime forms for any additional or previous information. System Downtime Recovery  The EMR experienced a system downtime. This downtime occurred on September 9 at 0100 for a duration of 2 hour(s). During this downtime paper charting was completed by me. The following was documented on paper during the downtime and is now being back-entered. The following is remaining on paper and will be scanned into Epic: triage notes, triage assessment, medication administration.             Alex Lam RN  09/09/23 5042

## 2023-09-09 NOTE — ED PROVIDER NOTES
EMERGENCY DEPARTMENT HISTORY AND PHYSICAL EXAM      Patient Name: Kym Villegas  MRN: [id number]  YOB: 1960  Provider: Kyler Byrne DO  PCP: Malik Garcia MD   Time/Date of evaluation: 3:44 AM EDT on 23    History of Presenting Illness     No chief complaint on file. History Provided By: EMS and Patient     History Dean Hameed): Kym Villegas is a 59 y.o. female with a PMHX of rheumatoid arthritis and hyperlipidemia  who presents to the emergency department  by EMS C/O fall and right-sided rib pain onset 2 hours prior to arrival.  Patient states that she was outside and going back into the house to get something to drink when her Rottweiler got tangled up in her feet. Patient states that she fell onto her right rib area. Patient states that she had immediate intense pain on the right ribs. Patient denies hitting her head. Patient denies any loss conscious. Patient denies any neck or head pain. Patient states that she is not on any blood thinners. Patient states that she was unable to get up due to the pain. Patient states that anytime she takes a breath or moves it hurts on the right side. Patient states that she had had a couple of drinks earlier in the evening prior to this incident. Past History     Past Medical History:  Past Medical History:   Diagnosis Date    Arthritis     Rheumatoid arthritis ([address])        Past Surgical History:  Past Surgical History:   Procedure Laterality Date     SECTION      HYSTERECTOMY (CERVIX STATUS UNKNOWN)      ROTATOR CUFF REPAIR Right        Family History:  No family history on file. Social History:  Social History     Tobacco Use    Smoking status: Every Day    Smokeless tobacco: Never   Substance Use Topics    Alcohol use: Yes    Drug use: Never       Medications:  No current facility-administered medications for this encounter.      Current Outpatient Medications   Medication Sig Dispense Refill    citalopram (CELEXA) (DILAUDID) injection 1 mg (1 mg IntraVENous Given 9/9/23 0254)   morphine injection 4 mg (4 mg IntraVENous Given 9/9/23 0203)   ondansetron (ZOFRAN) injection 4 mg (4 mg IntraVENous Given 9/9/23 0253)            Medical Decision Making     CC/HPI Summary, DDx, ED Course, and Reassessment: This is a 81-year-old female who is brought to the emergency room with chief complaint of fall and right rib pain. Differential diagnosis includes rib fracture, rib contusion, pulmonary contusion, pneumothorax. Discussed results plan with patient. Patient has multiple rib fractures with small to moderate pneumothorax. Patient will need to be seen by trauma. I spoke with Dr. Hanane Hudson at [address] [place] [address] ED. She accepts the patient in transfer. Patient has remained stable throughout her time in the emergency room. Disposition Considerations (tests considered but not done, Admit vs D/C, Shared Decision Making, Pt Expectation of Test or Tx.): Considered other imaging of head and cervical spine. However, trauma services will do this once the patient arrives at the trauma center. Critical Care Time:     35 minutes    Nayla Singh DO    Diagnosis and Disposition     DIAGNOSIS:   1. Traumatic pneumothorax, initial encounter    2. Closed fracture of multiple ribs of right side, initial encounter    3. Traumatic subcutaneous emphysema, initial encounter [facility])        DISPOSITION Decision To Transfer 09/09/2023 03:24:03 AM      PATIENT REFERRED TO:  [address] [place] [address] ED for trauma evaluation. (Please note that portions of this note were completed with a voice recognition program.  Efforts were made to edit the dictations but occasionally words are mis-transcribed.)    Nayla Singh DO (electronically signed)    INayla DO, am the primary clinician of record. Dragon Disclaimer     Please note that this dictation was completed with [employer], the computer voice recognition software.   Quite often unanticipated grammatical,

## 2023-09-09 NOTE — ED NOTES
Per Transfer center flight has declined due to visibility. [facility] called and eta is 45 minutes.       Damien Olsen RN  09/09/23 4772

## 2023-09-09 NOTE — ED NOTES
Pt received 4 mg morphine IV and Bolus of normal saline per Dr Robina Buckner order. Documented on downtime forms.       Alex Lam RN  09/09/23 1775

## 2023-09-09 NOTE — ED TRIAGE NOTES
Pt arrived via ems after a ground level fall in her driveway. Pt complaining of R upper flank pain with pain mainly around her R lower ribs. Tender upon palpation. Some crepitous felt. PT awake, alert, oriented x4. Denies LOC or hitting her head. Denies being on blood thinners. Pt vitals stable and sats above 97 percent on room air. Pt given 100mcg fentanyl by ems.

## 2024-09-12 ENCOUNTER — APPOINTMENT (OUTPATIENT)
Age: 64
End: 2024-09-12
Payer: COMMERCIAL

## 2024-09-12 ENCOUNTER — HOSPITAL ENCOUNTER (OUTPATIENT)
Age: 64
Setting detail: OBSERVATION
Discharge: ANOTHER HOSPITAL | End: 2024-09-13
Attending: FAMILY MEDICINE | Admitting: INTERNAL MEDICINE
Payer: COMMERCIAL

## 2024-09-12 DIAGNOSIS — E87.6 HYPOKALEMIA: ICD-10-CM

## 2024-09-12 DIAGNOSIS — M54.16 LUMBAR RADICULOPATHY, ACUTE: ICD-10-CM

## 2024-09-12 DIAGNOSIS — M51.26 HERNIATED LUMBAR INTERVERTEBRAL DISC: Primary | ICD-10-CM

## 2024-09-12 LAB
ALBUMIN SERPL-MCNC: 4.6 G/DL (ref 3.4–5)
ALBUMIN/GLOB SERPL: 1.4 (ref 0.8–1.7)
ALP SERPL-CCNC: 67 U/L (ref 45–117)
ALT SERPL-CCNC: 30 U/L (ref 13–56)
ANION GAP SERPL CALC-SCNC: 7 MMOL/L (ref 3–18)
APPEARANCE UR: CLEAR
AST SERPL W P-5'-P-CCNC: 16 U/L (ref 10–38)
BACTERIA URNS QL MICRO: ABNORMAL /HPF
BASOPHILS # BLD: 0 K/UL (ref 0–0.1)
BASOPHILS NFR BLD: 0 % (ref 0–2)
BILIRUB SERPL-MCNC: 0.5 MG/DL (ref 0.2–1)
BILIRUB UR QL: NEGATIVE
BUN SERPL-MCNC: 14 MG/DL (ref 7–18)
BUN/CREAT SERPL: 18 (ref 12–20)
CA-I BLD-MCNC: 9.7 MG/DL (ref 8.5–10.1)
CHLORIDE SERPL-SCNC: 105 MMOL/L (ref 100–111)
CK SERPL-CCNC: 116 U/L (ref 26–192)
CO2 SERPL-SCNC: 30 MMOL/L (ref 21–32)
COLOR UR: YELLOW
CREAT SERPL-MCNC: 0.77 MG/DL (ref 0.6–1.3)
CRP SERPL-MCNC: <0.3 MG/DL (ref 0–0.3)
DIFFERENTIAL METHOD BLD: NORMAL
EOSINOPHIL # BLD: 0 K/UL (ref 0–0.4)
EOSINOPHIL NFR BLD: 0 % (ref 0–5)
EPITH CASTS URNS QL MICRO: ABNORMAL /LPF (ref 0–20)
ERYTHROCYTE [DISTWIDTH] IN BLOOD BY AUTOMATED COUNT: 12.7 % (ref 11.6–14.5)
ERYTHROCYTE [SEDIMENTATION RATE] IN BLOOD: 17 MM/HR (ref 0–30)
GLOBULIN SER CALC-MCNC: 3.3 G/DL (ref 2–4)
GLUCOSE SERPL-MCNC: 115 MG/DL (ref 74–99)
GLUCOSE UR STRIP.AUTO-MCNC: NEGATIVE MG/DL
HCT VFR BLD AUTO: 43.1 % (ref 35–45)
HGB BLD-MCNC: 14.3 G/DL (ref 12–16)
HGB UR QL STRIP: ABNORMAL
IMM GRANULOCYTES # BLD AUTO: 0 K/UL (ref 0–0.04)
IMM GRANULOCYTES NFR BLD AUTO: 0 % (ref 0–0.5)
KETONES UR QL STRIP.AUTO: NEGATIVE MG/DL
LEUKOCYTE ESTERASE UR QL STRIP.AUTO: NEGATIVE
LYMPHOCYTES # BLD: 2.6 K/UL (ref 0.9–3.6)
LYMPHOCYTES NFR BLD: 24 % (ref 21–52)
MAGNESIUM SERPL-MCNC: 2.1 MG/DL (ref 1.6–2.6)
MCH RBC QN AUTO: 29.5 PG (ref 24–34)
MCHC RBC AUTO-ENTMCNC: 33.2 G/DL (ref 31–37)
MCV RBC AUTO: 88.9 FL (ref 78–100)
MONOCYTES # BLD: 0.7 K/UL (ref 0.05–1.2)
MONOCYTES NFR BLD: 6 % (ref 3–10)
NEUTS SEG # BLD: 7.5 K/UL (ref 1.8–8)
NEUTS SEG NFR BLD: 70 % (ref 40–73)
NITRITE UR QL STRIP.AUTO: NEGATIVE
NRBC # BLD: 0 K/UL (ref 0–0.01)
NRBC BLD-RTO: 0 PER 100 WBC
PH UR STRIP: 7 (ref 5–8)
PLATELET # BLD AUTO: 314 K/UL (ref 135–420)
PMV BLD AUTO: 9.3 FL (ref 9.2–11.8)
POTASSIUM SERPL-SCNC: 3.3 MMOL/L (ref 3.5–5.5)
PROT SERPL-MCNC: 7.9 G/DL (ref 6.4–8.2)
PROT UR STRIP-MCNC: NEGATIVE MG/DL
RBC # BLD AUTO: 4.85 M/UL (ref 4.2–5.3)
RBC #/AREA URNS HPF: ABNORMAL /HPF (ref 0–2)
SODIUM SERPL-SCNC: 142 MMOL/L (ref 136–145)
SP GR UR REFRACTOMETRY: 1.01 (ref 1–1.03)
UROBILINOGEN UR QL STRIP.AUTO: 0.2 EU/DL (ref 0.2–1)
WBC # BLD AUTO: 10.9 K/UL (ref 4.6–13.2)
WBC URNS QL MICRO: ABNORMAL /HPF (ref 0–4)

## 2024-09-12 PROCEDURE — 6370000000 HC RX 637 (ALT 250 FOR IP): Performed by: NURSE PRACTITIONER

## 2024-09-12 PROCEDURE — 6360000002 HC RX W HCPCS: Performed by: EMERGENCY MEDICINE

## 2024-09-12 PROCEDURE — 72131 CT LUMBAR SPINE W/O DYE: CPT

## 2024-09-12 PROCEDURE — 86140 C-REACTIVE PROTEIN: CPT

## 2024-09-12 PROCEDURE — 99285 EMERGENCY DEPT VISIT HI MDM: CPT

## 2024-09-12 PROCEDURE — 83735 ASSAY OF MAGNESIUM: CPT

## 2024-09-12 PROCEDURE — 85025 COMPLETE CBC W/AUTO DIFF WBC: CPT

## 2024-09-12 PROCEDURE — 2580000003 HC RX 258: Performed by: NURSE PRACTITIONER

## 2024-09-12 PROCEDURE — 96375 TX/PRO/DX INJ NEW DRUG ADDON: CPT

## 2024-09-12 PROCEDURE — G0378 HOSPITAL OBSERVATION PER HR: HCPCS

## 2024-09-12 PROCEDURE — 6360000002 HC RX W HCPCS: Performed by: FAMILY MEDICINE

## 2024-09-12 PROCEDURE — 80053 COMPREHEN METABOLIC PANEL: CPT

## 2024-09-12 PROCEDURE — 6370000000 HC RX 637 (ALT 250 FOR IP): Performed by: EMERGENCY MEDICINE

## 2024-09-12 PROCEDURE — 36415 COLL VENOUS BLD VENIPUNCTURE: CPT

## 2024-09-12 PROCEDURE — 82550 ASSAY OF CK (CPK): CPT

## 2024-09-12 PROCEDURE — 2580000003 HC RX 258: Performed by: EMERGENCY MEDICINE

## 2024-09-12 PROCEDURE — 2500000003 HC RX 250 WO HCPCS: Performed by: EMERGENCY MEDICINE

## 2024-09-12 PROCEDURE — 96376 TX/PRO/DX INJ SAME DRUG ADON: CPT

## 2024-09-12 PROCEDURE — 6360000002 HC RX W HCPCS: Performed by: NURSE PRACTITIONER

## 2024-09-12 PROCEDURE — 96374 THER/PROPH/DIAG INJ IV PUSH: CPT

## 2024-09-12 PROCEDURE — 81001 URINALYSIS AUTO W/SCOPE: CPT

## 2024-09-12 PROCEDURE — 85651 RBC SED RATE NONAUTOMATED: CPT

## 2024-09-12 PROCEDURE — 94761 N-INVAS EAR/PLS OXIMETRY MLT: CPT

## 2024-09-12 PROCEDURE — 96361 HYDRATE IV INFUSION ADD-ON: CPT

## 2024-09-12 RX ORDER — ACETAMINOPHEN 325 MG/1
650 TABLET ORAL EVERY 6 HOURS PRN
Status: DISCONTINUED | OUTPATIENT
Start: 2024-09-12 | End: 2024-09-14 | Stop reason: HOSPADM

## 2024-09-12 RX ORDER — ROSUVASTATIN CALCIUM 10 MG/1
20 TABLET, COATED ORAL NIGHTLY
Status: DISCONTINUED | OUTPATIENT
Start: 2024-09-12 | End: 2024-09-14 | Stop reason: HOSPADM

## 2024-09-12 RX ORDER — DIPHENHYDRAMINE HYDROCHLORIDE 50 MG/ML
25 INJECTION INTRAMUSCULAR; INTRAVENOUS
Status: COMPLETED | OUTPATIENT
Start: 2024-09-12 | End: 2024-09-12

## 2024-09-12 RX ORDER — KETOROLAC TROMETHAMINE 30 MG/ML
30 INJECTION, SOLUTION INTRAMUSCULAR; INTRAVENOUS
Status: COMPLETED | OUTPATIENT
Start: 2024-09-12 | End: 2024-09-12

## 2024-09-12 RX ORDER — CYCLOBENZAPRINE HCL 10 MG
10 TABLET ORAL
Status: COMPLETED | OUTPATIENT
Start: 2024-09-12 | End: 2024-09-12

## 2024-09-12 RX ORDER — ONDANSETRON 2 MG/ML
4 INJECTION INTRAMUSCULAR; INTRAVENOUS EVERY 6 HOURS PRN
Status: DISCONTINUED | OUTPATIENT
Start: 2024-09-12 | End: 2024-09-14 | Stop reason: HOSPADM

## 2024-09-12 RX ORDER — ROSUVASTATIN CALCIUM 10 MG/1
40 TABLET, COATED ORAL NIGHTLY
Status: DISCONTINUED | OUTPATIENT
Start: 2024-09-12 | End: 2024-09-12

## 2024-09-12 RX ORDER — SODIUM CHLORIDE 0.9 % (FLUSH) 0.9 %
5-40 SYRINGE (ML) INJECTION PRN
Status: DISCONTINUED | OUTPATIENT
Start: 2024-09-12 | End: 2024-09-14 | Stop reason: HOSPADM

## 2024-09-12 RX ORDER — BUPROPION HYDROCHLORIDE 150 MG/1
150 TABLET ORAL EVERY MORNING
Status: DISCONTINUED | OUTPATIENT
Start: 2024-09-13 | End: 2024-09-14 | Stop reason: HOSPADM

## 2024-09-12 RX ORDER — TRAMADOL HYDROCHLORIDE 50 MG/1
50 TABLET ORAL EVERY 6 HOURS PRN
Status: DISCONTINUED | OUTPATIENT
Start: 2024-09-12 | End: 2024-09-13

## 2024-09-12 RX ORDER — TRAZODONE HYDROCHLORIDE 50 MG/1
50 TABLET, FILM COATED ORAL NIGHTLY PRN
Status: DISCONTINUED | OUTPATIENT
Start: 2024-09-12 | End: 2024-09-14 | Stop reason: HOSPADM

## 2024-09-12 RX ORDER — KETOROLAC TROMETHAMINE 10 MG/1
10 TABLET, FILM COATED ORAL EVERY 8 HOURS PRN
Qty: 15 TABLET | Refills: 0 | Status: SHIPPED | OUTPATIENT
Start: 2024-09-12

## 2024-09-12 RX ORDER — SODIUM CHLORIDE 0.9 % (FLUSH) 0.9 %
5-40 SYRINGE (ML) INJECTION EVERY 12 HOURS SCHEDULED
Status: DISCONTINUED | OUTPATIENT
Start: 2024-09-12 | End: 2024-09-14 | Stop reason: HOSPADM

## 2024-09-12 RX ORDER — ONDANSETRON 4 MG/1
4 TABLET, ORALLY DISINTEGRATING ORAL EVERY 8 HOURS PRN
Status: DISCONTINUED | OUTPATIENT
Start: 2024-09-12 | End: 2024-09-14 | Stop reason: HOSPADM

## 2024-09-12 RX ORDER — POLYETHYLENE GLYCOL 3350 17 G/17G
17 POWDER, FOR SOLUTION ORAL DAILY PRN
Status: DISCONTINUED | OUTPATIENT
Start: 2024-09-12 | End: 2024-09-14 | Stop reason: HOSPADM

## 2024-09-12 RX ORDER — BUPROPION HYDROCHLORIDE 150 MG/1
150 TABLET ORAL EVERY MORNING
COMMUNITY
Start: 2024-09-04

## 2024-09-12 RX ORDER — METHOCARBAMOL 500 MG/1
500 TABLET, FILM COATED ORAL 4 TIMES DAILY PRN
COMMUNITY
Start: 2024-09-11

## 2024-09-12 RX ORDER — SODIUM CHLORIDE AND POTASSIUM CHLORIDE 150; 900 MG/100ML; MG/100ML
INJECTION, SOLUTION INTRAVENOUS ONCE
Status: COMPLETED | OUTPATIENT
Start: 2024-09-12 | End: 2024-09-12

## 2024-09-12 RX ORDER — POTASSIUM CHLORIDE 750 MG/1
40 TABLET, EXTENDED RELEASE ORAL ONCE
Status: COMPLETED | OUTPATIENT
Start: 2024-09-12 | End: 2024-09-12

## 2024-09-12 RX ORDER — ACETAMINOPHEN 650 MG/1
650 SUPPOSITORY RECTAL EVERY 6 HOURS PRN
Status: DISCONTINUED | OUTPATIENT
Start: 2024-09-12 | End: 2024-09-14 | Stop reason: HOSPADM

## 2024-09-12 RX ORDER — HYDROMORPHONE HYDROCHLORIDE 1 MG/ML
0.5 INJECTION, SOLUTION INTRAMUSCULAR; INTRAVENOUS; SUBCUTANEOUS EVERY 4 HOURS PRN
Status: DISCONTINUED | OUTPATIENT
Start: 2024-09-12 | End: 2024-09-13

## 2024-09-12 RX ADMIN — HYDROMORPHONE HYDROCHLORIDE 0.5 MG: 1 INJECTION, SOLUTION INTRAMUSCULAR; INTRAVENOUS; SUBCUTANEOUS at 22:13

## 2024-09-12 RX ADMIN — METHYLPREDNISOLONE SODIUM SUCCINATE 40 MG: 40 INJECTION, POWDER, FOR SOLUTION INTRAMUSCULAR; INTRAVENOUS at 09:54

## 2024-09-12 RX ADMIN — POTASSIUM CHLORIDE AND SODIUM CHLORIDE: 900; 150 INJECTION, SOLUTION INTRAVENOUS at 08:28

## 2024-09-12 RX ADMIN — SODIUM CHLORIDE, PRESERVATIVE FREE 10 ML: 5 INJECTION INTRAVENOUS at 19:57

## 2024-09-12 RX ADMIN — HYDROMORPHONE HYDROCHLORIDE 0.5 MG: 1 INJECTION, SOLUTION INTRAMUSCULAR; INTRAVENOUS; SUBCUTANEOUS at 17:52

## 2024-09-12 RX ADMIN — METHYLPREDNISOLONE SODIUM SUCCINATE 125 MG: 125 INJECTION, POWDER, FOR SOLUTION INTRAMUSCULAR; INTRAVENOUS at 13:44

## 2024-09-12 RX ADMIN — FAMOTIDINE 20 MG: 10 INJECTION, SOLUTION INTRAVENOUS at 13:44

## 2024-09-12 RX ADMIN — POTASSIUM CHLORIDE 40 MEQ: 750 TABLET, EXTENDED RELEASE ORAL at 17:51

## 2024-09-12 RX ADMIN — CYCLOBENZAPRINE 10 MG: 10 TABLET, FILM COATED ORAL at 11:06

## 2024-09-12 RX ADMIN — KETOROLAC TROMETHAMINE 30 MG: 30 INJECTION, SOLUTION INTRAMUSCULAR at 09:54

## 2024-09-12 RX ADMIN — HYDROMORPHONE HYDROCHLORIDE 0.5 MG: 1 INJECTION, SOLUTION INTRAMUSCULAR; INTRAVENOUS; SUBCUTANEOUS at 13:51

## 2024-09-12 RX ADMIN — METHYLPREDNISOLONE SODIUM SUCCINATE 125 MG: 125 INJECTION, POWDER, FOR SOLUTION INTRAMUSCULAR; INTRAVENOUS at 07:10

## 2024-09-12 RX ADMIN — HYDROMORPHONE HYDROCHLORIDE 1 MG: 1 INJECTION, SOLUTION INTRAMUSCULAR; INTRAVENOUS; SUBCUTANEOUS at 07:07

## 2024-09-12 RX ADMIN — DIPHENHYDRAMINE HYDROCHLORIDE 25 MG: 50 INJECTION INTRAMUSCULAR; INTRAVENOUS at 07:32

## 2024-09-12 RX ADMIN — HYDROMORPHONE HYDROCHLORIDE 0.5 MG: 1 INJECTION, SOLUTION INTRAMUSCULAR; INTRAVENOUS; SUBCUTANEOUS at 09:53

## 2024-09-12 RX ADMIN — ROSUVASTATIN CALCIUM 20 MG: 10 TABLET, FILM COATED ORAL at 19:51

## 2024-09-12 ASSESSMENT — PAIN DESCRIPTION - ORIENTATION
ORIENTATION: LEFT
ORIENTATION: LOWER
ORIENTATION: LEFT

## 2024-09-12 ASSESSMENT — PAIN SCALES - GENERAL
PAINLEVEL_OUTOF10: 1
PAINLEVEL_OUTOF10: 10
PAINLEVEL_OUTOF10: 7
PAINLEVEL_OUTOF10: 5
PAINLEVEL_OUTOF10: 10
PAINLEVEL_OUTOF10: 2
PAINLEVEL_OUTOF10: 10
PAINLEVEL_OUTOF10: 2
PAINLEVEL_OUTOF10: 6

## 2024-09-12 ASSESSMENT — PAIN DESCRIPTION - DESCRIPTORS
DESCRIPTORS: SHOOTING
DESCRIPTORS: SHARP
DESCRIPTORS: SHOOTING;TINGLING;NUMBNESS
DESCRIPTORS: SHOOTING
DESCRIPTORS: SPASM

## 2024-09-12 ASSESSMENT — PAIN DESCRIPTION - LOCATION
LOCATION: BACK;LEG
LOCATION: GROIN
LOCATION: BACK;LEG
LOCATION: BACK;LEG
LOCATION: BACK
LOCATION: BACK

## 2024-09-12 ASSESSMENT — ENCOUNTER SYMPTOMS
BACK PAIN: 1
RESPIRATORY NEGATIVE: 1
NAUSEA: 1

## 2024-09-12 ASSESSMENT — PAIN - FUNCTIONAL ASSESSMENT: PAIN_FUNCTIONAL_ASSESSMENT: 0-10

## 2024-09-13 ENCOUNTER — APPOINTMENT (OUTPATIENT)
Age: 64
End: 2024-09-13
Payer: COMMERCIAL

## 2024-09-13 VITALS
OXYGEN SATURATION: 99 % | WEIGHT: 139 LBS | HEIGHT: 64 IN | SYSTOLIC BLOOD PRESSURE: 133 MMHG | HEART RATE: 52 BPM | BODY MASS INDEX: 23.73 KG/M2 | DIASTOLIC BLOOD PRESSURE: 65 MMHG | TEMPERATURE: 98 F | RESPIRATION RATE: 16 BRPM

## 2024-09-13 LAB
ANION GAP SERPL CALC-SCNC: 5 MMOL/L (ref 3–18)
BUN SERPL-MCNC: 21 MG/DL (ref 7–18)
BUN/CREAT SERPL: 30 (ref 12–20)
CA-I BLD-MCNC: 8.8 MG/DL (ref 8.5–10.1)
CHLORIDE SERPL-SCNC: 107 MMOL/L (ref 100–111)
CO2 SERPL-SCNC: 28 MMOL/L (ref 21–32)
CREAT SERPL-MCNC: 0.71 MG/DL (ref 0.6–1.3)
ERYTHROCYTE [DISTWIDTH] IN BLOOD BY AUTOMATED COUNT: 13.1 % (ref 11.6–14.5)
GLUCOSE SERPL-MCNC: 122 MG/DL (ref 74–99)
HCT VFR BLD AUTO: 38.1 % (ref 35–45)
HGB BLD-MCNC: 12.5 G/DL (ref 12–16)
MCH RBC QN AUTO: 30 PG (ref 24–34)
MCHC RBC AUTO-ENTMCNC: 32.8 G/DL (ref 31–37)
MCV RBC AUTO: 91.6 FL (ref 78–100)
NRBC # BLD: 0 K/UL (ref 0–0.01)
NRBC BLD-RTO: 0 PER 100 WBC
PLATELET # BLD AUTO: 292 K/UL (ref 135–420)
PMV BLD AUTO: 9.9 FL (ref 9.2–11.8)
POTASSIUM SERPL-SCNC: 4.1 MMOL/L (ref 3.5–5.5)
RBC # BLD AUTO: 4.16 M/UL (ref 4.2–5.3)
SODIUM SERPL-SCNC: 140 MMOL/L (ref 136–145)
WBC # BLD AUTO: 11.2 K/UL (ref 4.6–13.2)

## 2024-09-13 PROCEDURE — 6360000002 HC RX W HCPCS: Performed by: NURSE PRACTITIONER

## 2024-09-13 PROCEDURE — G0378 HOSPITAL OBSERVATION PER HR: HCPCS

## 2024-09-13 PROCEDURE — 94761 N-INVAS EAR/PLS OXIMETRY MLT: CPT

## 2024-09-13 PROCEDURE — 36415 COLL VENOUS BLD VENIPUNCTURE: CPT

## 2024-09-13 PROCEDURE — 6360000002 HC RX W HCPCS: Performed by: INTERNAL MEDICINE

## 2024-09-13 PROCEDURE — 85027 COMPLETE CBC AUTOMATED: CPT

## 2024-09-13 PROCEDURE — 6370000000 HC RX 637 (ALT 250 FOR IP): Performed by: INTERNAL MEDICINE

## 2024-09-13 PROCEDURE — 6370000000 HC RX 637 (ALT 250 FOR IP): Performed by: NURSE PRACTITIONER

## 2024-09-13 PROCEDURE — 72148 MRI LUMBAR SPINE W/O DYE: CPT

## 2024-09-13 PROCEDURE — 80048 BASIC METABOLIC PNL TOTAL CA: CPT

## 2024-09-13 PROCEDURE — 2580000003 HC RX 258: Performed by: NURSE PRACTITIONER

## 2024-09-13 RX ORDER — OXYCODONE AND ACETAMINOPHEN 5; 325 MG/1; MG/1
1 TABLET ORAL EVERY 4 HOURS PRN
Status: DISCONTINUED | OUTPATIENT
Start: 2024-09-13 | End: 2024-09-14 | Stop reason: HOSPADM

## 2024-09-13 RX ORDER — OXYCODONE AND ACETAMINOPHEN 5; 325 MG/1; MG/1
2 TABLET ORAL EVERY 4 HOURS PRN
Status: DISCONTINUED | OUTPATIENT
Start: 2024-09-13 | End: 2024-09-14 | Stop reason: HOSPADM

## 2024-09-13 RX ORDER — HYDROMORPHONE HYDROCHLORIDE 1 MG/ML
1 INJECTION, SOLUTION INTRAMUSCULAR; INTRAVENOUS; SUBCUTANEOUS
Status: DISCONTINUED | OUTPATIENT
Start: 2024-09-13 | End: 2024-09-14 | Stop reason: HOSPADM

## 2024-09-13 RX ORDER — TRAMADOL HYDROCHLORIDE 50 MG/1
50 TABLET ORAL 3 TIMES DAILY PRN
COMMUNITY

## 2024-09-13 RX ADMIN — HYDROMORPHONE HYDROCHLORIDE 1 MG: 1 INJECTION, SOLUTION INTRAMUSCULAR; INTRAVENOUS; SUBCUTANEOUS at 15:52

## 2024-09-13 RX ADMIN — HYDROMORPHONE HYDROCHLORIDE 1 MG: 1 INJECTION, SOLUTION INTRAMUSCULAR; INTRAVENOUS; SUBCUTANEOUS at 18:43

## 2024-09-13 RX ADMIN — SODIUM CHLORIDE, PRESERVATIVE FREE 10 ML: 5 INJECTION INTRAVENOUS at 20:10

## 2024-09-13 RX ADMIN — WATER 40 MG: 1 INJECTION INTRAMUSCULAR; INTRAVENOUS; SUBCUTANEOUS at 09:41

## 2024-09-13 RX ADMIN — HYDROMORPHONE HYDROCHLORIDE 1 MG: 1 INJECTION, SOLUTION INTRAMUSCULAR; INTRAVENOUS; SUBCUTANEOUS at 22:16

## 2024-09-13 RX ADMIN — OXYCODONE HYDROCHLORIDE AND ACETAMINOPHEN 1 TABLET: 5; 325 TABLET ORAL at 15:51

## 2024-09-13 RX ADMIN — OXYCODONE HYDROCHLORIDE AND ACETAMINOPHEN 2 TABLET: 5; 325 TABLET ORAL at 11:22

## 2024-09-13 RX ADMIN — HYDROMORPHONE HYDROCHLORIDE 0.5 MG: 1 INJECTION, SOLUTION INTRAMUSCULAR; INTRAVENOUS; SUBCUTANEOUS at 03:25

## 2024-09-13 RX ADMIN — HYDROMORPHONE HYDROCHLORIDE 0.5 MG: 1 INJECTION, SOLUTION INTRAMUSCULAR; INTRAVENOUS; SUBCUTANEOUS at 07:45

## 2024-09-13 RX ADMIN — SODIUM CHLORIDE, PRESERVATIVE FREE 10 ML: 5 INJECTION INTRAVENOUS at 09:42

## 2024-09-13 RX ADMIN — BUPROPION HYDROCHLORIDE 150 MG: 150 TABLET, EXTENDED RELEASE ORAL at 09:41

## 2024-09-13 RX ADMIN — TRAMADOL HYDROCHLORIDE 50 MG: 50 TABLET ORAL at 10:28

## 2024-09-13 RX ADMIN — HYDROMORPHONE HYDROCHLORIDE 1 MG: 1 INJECTION, SOLUTION INTRAMUSCULAR; INTRAVENOUS; SUBCUTANEOUS at 12:46

## 2024-09-13 RX ADMIN — ROSUVASTATIN CALCIUM 20 MG: 10 TABLET, FILM COATED ORAL at 20:09

## 2024-09-13 ASSESSMENT — PAIN DESCRIPTION - FREQUENCY
FREQUENCY: CONTINUOUS
FREQUENCY: CONTINUOUS

## 2024-09-13 ASSESSMENT — PAIN - FUNCTIONAL ASSESSMENT
PAIN_FUNCTIONAL_ASSESSMENT: PREVENTS OR INTERFERES WITH MANY ACTIVE NOT PASSIVE ACTIVITIES
PAIN_FUNCTIONAL_ASSESSMENT: PREVENTS OR INTERFERES WITH ALL ACTIVE AND SOME PASSIVE ACTIVITIES

## 2024-09-13 ASSESSMENT — PAIN DESCRIPTION - ONSET
ONSET: ON-GOING
ONSET: ON-GOING

## 2024-09-13 ASSESSMENT — PAIN SCALES - GENERAL
PAINLEVEL_OUTOF10: 2
PAINLEVEL_OUTOF10: 8
PAINLEVEL_OUTOF10: 6
PAINLEVEL_OUTOF10: 2
PAINLEVEL_OUTOF10: 5
PAINLEVEL_OUTOF10: 7
PAINLEVEL_OUTOF10: 4
PAINLEVEL_OUTOF10: 0
PAINLEVEL_OUTOF10: 7
PAINLEVEL_OUTOF10: 4
PAINLEVEL_OUTOF10: 7
PAINLEVEL_OUTOF10: 7
PAINLEVEL_OUTOF10: 3
PAINLEVEL_OUTOF10: 5
PAINLEVEL_OUTOF10: 4

## 2024-09-13 ASSESSMENT — PAIN DESCRIPTION - DESCRIPTORS
DESCRIPTORS: ACHING
DESCRIPTORS: SHARP
DESCRIPTORS: SHARP
DESCRIPTORS: ACHING;SHOOTING
DESCRIPTORS: ACHING;DISCOMFORT;STABBING

## 2024-09-13 ASSESSMENT — PAIN DESCRIPTION - LOCATION
LOCATION: BACK
LOCATION: HIP;LEG
LOCATION: BACK;LEG
LOCATION: HIP;LEG
LOCATION: BACK
LOCATION: HIP;LEG
LOCATION: HIP;LEG

## 2024-09-13 ASSESSMENT — PAIN DESCRIPTION - PAIN TYPE
TYPE: ACUTE PAIN
TYPE: ACUTE PAIN

## 2024-09-13 ASSESSMENT — PAIN DESCRIPTION - ORIENTATION
ORIENTATION: LEFT
ORIENTATION: LEFT;LOWER
ORIENTATION: LOWER;POSTERIOR
ORIENTATION: LEFT
ORIENTATION: LEFT
ORIENTATION: LOWER;POSTERIOR
ORIENTATION: LEFT

## 2024-09-13 ASSESSMENT — PAIN SCALES - WONG BAKER
WONGBAKER_NUMERICALRESPONSE: NO HURT
WONGBAKER_NUMERICALRESPONSE: HURTS LITTLE MORE

## 2024-09-17 ENCOUNTER — HOSPITAL ENCOUNTER (EMERGENCY)
Age: 64
Discharge: ANOTHER HOSPITAL | End: 2024-09-18
Attending: FAMILY MEDICINE
Payer: COMMERCIAL

## 2024-09-17 DIAGNOSIS — R52 INTRACTABLE PAIN: Primary | ICD-10-CM

## 2024-09-17 DIAGNOSIS — M51.16 RADICULOPATHY DUE TO DISORDER OF INTERVERTEBRAL DISC OF LUMBAR SPINE: ICD-10-CM

## 2024-09-17 PROCEDURE — 96375 TX/PRO/DX INJ NEW DRUG ADDON: CPT

## 2024-09-17 PROCEDURE — 6360000002 HC RX W HCPCS: Performed by: FAMILY MEDICINE

## 2024-09-17 PROCEDURE — 99285 EMERGENCY DEPT VISIT HI MDM: CPT

## 2024-09-17 PROCEDURE — 96374 THER/PROPH/DIAG INJ IV PUSH: CPT

## 2024-09-17 PROCEDURE — 2580000003 HC RX 258: Performed by: FAMILY MEDICINE

## 2024-09-17 RX ORDER — OXYCODONE AND ACETAMINOPHEN 10; 325 MG/1; MG/1
1 TABLET ORAL EVERY 4 HOURS PRN
COMMUNITY

## 2024-09-17 RX ORDER — CYCLOBENZAPRINE HCL 10 MG
10 TABLET ORAL 2 TIMES DAILY PRN
COMMUNITY

## 2024-09-17 RX ORDER — DEXAMETHASONE SODIUM PHOSPHATE 10 MG/ML
10 INJECTION, SOLUTION INTRAMUSCULAR; INTRAVENOUS
Status: COMPLETED | OUTPATIENT
Start: 2024-09-17 | End: 2024-09-17

## 2024-09-17 RX ORDER — HYDROMORPHONE HYDROCHLORIDE 2 MG/1
2 TABLET ORAL
COMMUNITY

## 2024-09-17 RX ORDER — SODIUM CHLORIDE 9 MG/ML
INJECTION, SOLUTION INTRAVENOUS CONTINUOUS
Status: DISCONTINUED | OUTPATIENT
Start: 2024-09-17 | End: 2024-09-18 | Stop reason: HOSPADM

## 2024-09-17 RX ADMIN — SODIUM CHLORIDE: 9 INJECTION, SOLUTION INTRAVENOUS at 23:27

## 2024-09-17 RX ADMIN — DEXAMETHASONE SODIUM PHOSPHATE 10 MG: 10 INJECTION, SOLUTION INTRAMUSCULAR; INTRAVENOUS at 23:28

## 2024-09-17 RX ADMIN — HYDROMORPHONE HYDROCHLORIDE 0.5 MG: 1 INJECTION, SOLUTION INTRAMUSCULAR; INTRAVENOUS; SUBCUTANEOUS at 23:31

## 2024-09-17 ASSESSMENT — PAIN - FUNCTIONAL ASSESSMENT: PAIN_FUNCTIONAL_ASSESSMENT: 0-10

## 2024-09-17 ASSESSMENT — PAIN SCALES - GENERAL: PAINLEVEL_OUTOF10: 10

## 2024-09-18 VITALS
OXYGEN SATURATION: 97 % | HEART RATE: 78 BPM | BODY MASS INDEX: 23.73 KG/M2 | WEIGHT: 139 LBS | TEMPERATURE: 97.7 F | HEIGHT: 64 IN | SYSTOLIC BLOOD PRESSURE: 101 MMHG | DIASTOLIC BLOOD PRESSURE: 73 MMHG | RESPIRATION RATE: 18 BRPM

## 2024-09-18 PROCEDURE — 96376 TX/PRO/DX INJ SAME DRUG ADON: CPT

## 2024-09-18 PROCEDURE — 6370000000 HC RX 637 (ALT 250 FOR IP): Performed by: FAMILY MEDICINE

## 2024-09-18 PROCEDURE — 6360000002 HC RX W HCPCS: Performed by: FAMILY MEDICINE

## 2024-09-18 PROCEDURE — 96375 TX/PRO/DX INJ NEW DRUG ADDON: CPT

## 2024-09-18 RX ORDER — GABAPENTIN 100 MG/1
300 CAPSULE ORAL
Status: COMPLETED | OUTPATIENT
Start: 2024-09-18 | End: 2024-09-18

## 2024-09-18 RX ORDER — KETOROLAC TROMETHAMINE 30 MG/ML
15 INJECTION, SOLUTION INTRAMUSCULAR; INTRAVENOUS
Status: COMPLETED | OUTPATIENT
Start: 2024-09-18 | End: 2024-09-18

## 2024-09-18 RX ORDER — GABAPENTIN 300 MG/1
600 CAPSULE ORAL 2 TIMES DAILY
COMMUNITY
Start: 2023-09-16

## 2024-09-18 RX ADMIN — HYDROMORPHONE HYDROCHLORIDE 0.5 MG: 1 INJECTION, SOLUTION INTRAMUSCULAR; INTRAVENOUS; SUBCUTANEOUS at 00:47

## 2024-09-18 RX ADMIN — GABAPENTIN 300 MG: 100 CAPSULE ORAL at 02:12

## 2024-09-18 RX ADMIN — HYDROMORPHONE HYDROCHLORIDE 0.5 MG: 1 INJECTION, SOLUTION INTRAMUSCULAR; INTRAVENOUS; SUBCUTANEOUS at 07:46

## 2024-09-18 RX ADMIN — HYDROMORPHONE HYDROCHLORIDE 0.5 MG: 1 INJECTION, SOLUTION INTRAMUSCULAR; INTRAVENOUS; SUBCUTANEOUS at 05:03

## 2024-09-18 RX ADMIN — KETOROLAC TROMETHAMINE 15 MG: 30 INJECTION, SOLUTION INTRAMUSCULAR at 03:22

## 2024-09-18 ASSESSMENT — PAIN DESCRIPTION - LOCATION: LOCATION: BACK

## 2024-09-18 ASSESSMENT — PAIN SCALES - GENERAL: PAINLEVEL_OUTOF10: 8

## 2024-09-18 ASSESSMENT — PAIN DESCRIPTION - DESCRIPTORS: DESCRIPTORS: ACHING

## 2024-11-15 ENCOUNTER — HOSPITAL ENCOUNTER (OUTPATIENT)
Age: 64
Setting detail: RECURRING SERIES
Discharge: HOME | End: 2024-11-18
Payer: COMMERCIAL

## 2024-11-15 PROCEDURE — 97161 PT EVAL LOW COMPLEX 20 MIN: CPT

## 2024-11-15 NOTE — THERAPY EVALUATION
THORACOLUMBAR EVAL/ PT DAILY TREATMENT NOTE 10-18    Patient Name: Ruba Santiago  Date:11/15/2024  : 1960  [x]  Patient  Verified  Payor: KATHY / Plan: [employer] / Product Type: *No Product type* /    In time:9:14  Out time:9:52  Total Treatment Time (min): 38  Visit #: 1    Medicare/BCBS Only   Total Timed Codes (min):  0 1:1 Treatment Time:  38     Treatment Area: Low back pain, unspecified [M54.50]  Other intervertebral disc degeneration, lumbar region with discogenic back pain and lower extremity pain [M51.362]      Subjective:     Pt reports to OPPT s/p lumbar L4-L5 fusion on 10/27/24. She was referred to PT after last MD appointment and released from brace. Pt currently is working as a business owner. She states that she has been adherent to BLT precautions since her surgery. Pt reports that she has been having L LE pain that was unbearable prior to surgery. She states that her pain has decreased since her surgery, and is currently experiencing LE numbness around her L knee and proximal shin.      Patient Goals: \"I want to feel 100%.\"    Pain Level (0-10 scale): 2/10 current, 4/10 worst  []Sharp  []Dull  [x]Achy [x]Burning []Throbbing []N&T []Other:  [x]constant []intermittent [x]improving []worsening []no change since onset      Symptoms:  Aggravated by:   [] Bending [] Sitting [x] Standing [x] Walking   [x] Moving [] Cough [] Sneeze [] Valsalva   [] AM  [] PM  Lying:  [] sup   [] pro   [] sidelying   [] Other:     Eased by:    [] Bending [] Sitting [] Standing [] Walking   [] Moving [] AM  [] PM  Lying: [] sup  [] pro  [x] Sidelying R   [] Other:     General Health:  Red Flags Indicated? [] Yes    [x] No  [] Yes [] No Recent weight change (If yes, due to dieting? [] Yes  [] No)   [] Yes [] No Weakness in legs during walking  [] Yes [] No Unremitting pain at night  [] Yes [] No Abdominal pain or problems  [] Yes [] No Rectal bleeding  [] Yes [] No Feet more cold or

## 2024-11-15 NOTE — PLAN OF CARE
[facility] REHABILITATION  PHYSICAL THERAPY  [facility], [address] [place]  [address]  Phone: [phone]    Fax: [phone]     PLAN OF CARE / STATEMENT OF MEDICAL NECESSITY FOR PHYSICAL THERAPY SERVICES  Patient Name: Ruba Santiago : 1960   Medical   Diagnosis: Low back pain, unspecified [M54.50]  Other intervertebral disc degeneration, lumbar region with discogenic back pain and lower extremity pain [M51.362] Treatment Diagnosis: Low back pain   Onset Date: 24     Referral Source: Solitario Dong MD Start of Care (SOC): 11/15/2024   Prior Hospitalization: See medical history Provider #: [id number]   Prior Level of Function: Independent    Comorbidities: Past Medical History:   Diagnosis Date    Arthritis     Lumbar radiculopathy     Rheumatoid arthritis (HCC)       Medications: Verified on Patient Summary List   The Plan of Care and following information is based on the information from the initial evaluation.   ==========================================================================================  Assessment / Functional Analysis:    Pt is a 64 y.o. year old  female who presents to outpatient clinic today s/p L4-L5 posterior spinal fusion and transforaminal lumbar interbody fusion on 2024. Pt objectively presents with increased pain, decreased core and LE strength, decreased hamstring flexibility, paraesthesias noted in L4-S1 distribution in L LE, and 10/50 Oswestry denoting increased disability. She states that since her surgery, he LE symptoms have decreased. Pt also is able to verbalize spinal precautions and has been cleared from brace at her last MD appointment. She would benefit from skilled PT to address above listed impairments in function to optimize quality of life and restore patient to PLOF.       ==========================================================================================  Eval Complexity: History: LOW Complexity :

## 2024-11-20 ENCOUNTER — HOSPITAL ENCOUNTER (OUTPATIENT)
Age: 64
Setting detail: RECURRING SERIES
Discharge: HOME | End: 2024-11-23
Payer: COMMERCIAL

## 2024-11-20 PROCEDURE — 97110 THERAPEUTIC EXERCISES: CPT

## 2024-11-20 NOTE — PROGRESS NOTES
PT DAILY TREATMENT NOTE     Patient Name: Ruba Santiago  Date:2024  : 1960  [x]  Patient  Verified  Payor: KATHY / Plan: [employer] / Product Type: *No Product type* /    In time:934  Out time:102  Total Treatment Time (min): 48  Total Timed Codes (min): 48  1:1 Treatment Time (min): 21   Visit #: 2 of 11    Treatment Area: Low back pain, unspecified [M54.50]  Other intervertebral disc degeneration, lumbar region with discogenic back pain and lower extremity pain [M51.362]    SUBJECTIVE  Pt reports compliance with HEP. She reports pain and stiffness in lower back, particularly on the L side.     Pain Level (0-10 scale): 3    Any medication changes, allergies to medications, adverse drug reactions, diagnosis change, or new procedure performed?: [x] No    [] Yes (see summary sheet for update)    OBJECTIVE  Modality rationale: Declined   Min Type Additional Details    [] Estim: []Att   []Unatt  []TENS instruct                 []IFC  []Premod   []NMES                       []Other:  []w/US   []w/ice   []w/heat  Position:  Location:    []  Traction: [] Cervical       []Lumbar                       [] Prone          []Supine                       []Intermittent   []Continuous Lbs:  [] before manual  [] after manual    []  Ultrasound: []Continuous   [] Pulsed                           []1MHz   []3MHz Location:  W/cm2:    []  Iontophoresis with dexamethasone         Location: [] Take home patch   [] In clinic    []  Ice     []  heat  []  Ice massage Position:  Location:    []  Vasopneumatic Device Pressure: [] lo [] med [] hi   Temp: [] lo [] med [] hi   [] Skin assessment post-treatment:  []intact []redness- no adverse reaction       []redness - adverse reaction:     46 min Therapeutic Exercise:  [x] See flow sheet :   Rationale: increase ROM, increase strength, improve coordination, improve balance, and increase proprioception to improve the patient’s ability to return

## 2024-11-22 ENCOUNTER — HOSPITAL ENCOUNTER (OUTPATIENT)
Age: 64
Setting detail: RECURRING SERIES
Discharge: HOME | End: 2024-11-25
Payer: COMMERCIAL

## 2024-11-22 PROCEDURE — 97110 THERAPEUTIC EXERCISES: CPT

## 2024-11-22 NOTE — PROGRESS NOTES
PT DAILY TREATMENT NOTE 8-    Patient Name: Ruba Santiago  Date:2024  : 1960  [x]  Patient  Verified  Payor: KATHY / Plan: [employer] / Product Type: *No Product type* /    In time:0900  Out time:0935  Total Treatment Time (min): 35  Total Timed Codes (min): 35  1:1 Treatment Time (min): 35   Visit #: 3    Treatment Area: Low back pain, unspecified [M54.50]  Other intervertebral disc degeneration, lumbar region with discogenic back pain and lower extremity pain [M51.362]    SUBJECTIVE  Reports no new problems     Pain Level (0-10 scale): 2    Any medication changes, allergies to medications, adverse drug reactions, diagnosis change, or new procedure performed?: [x] No    [] Yes (see summary sheet for update)        OBJECTIVE        35 min Therapeutic Exercise:  [x] See flow sheet :   Rationale: increase ROM, increase strength, and improve coordination to improve the patient’s ability to return to PLOF while remaining pain free.                With TE  TA   NR  GT   Misc Patient Education: [x] Review HEP    [] Progressed/Changed HEP based on:   [] positioning   [] body mechanics   [] transfers   [] heat/ice application          Pain Level (0-10 scale) post treatment: 0    ASSESSMENT/Changes in Function: Session began with an active warm up on stepper for 10' level 1  followed by stretching/strengthening exercises per flowsheet working to improve lumbar strength, stability, and mobility . Plan to continue POC progressing as able.    Patient will continue to benefit from skilled PT services to modify and progress therapeutic interventions, analyze and address functional mobility deficits, analyze and address ROM deficits, analyze and address strength deficits, analyze and address soft tissue restrictions, analyze and cue for proper movement patterns, and analyze and modify for postural abnormalities to attain remaining goals.       [x]  See Plan of Care  []  See progress

## 2024-11-25 ENCOUNTER — APPOINTMENT (OUTPATIENT)
Age: 64
End: 2024-11-25
Payer: COMMERCIAL

## 2024-11-27 ENCOUNTER — HOSPITAL ENCOUNTER (OUTPATIENT)
Age: 64
Setting detail: RECURRING SERIES
Discharge: HOME | End: 2024-11-30
Payer: COMMERCIAL

## 2024-11-27 PROCEDURE — 97110 THERAPEUTIC EXERCISES: CPT

## 2024-11-27 NOTE — PROGRESS NOTES
With TE  TA   NR  GT   [facility] Patient Education: [x] Review HEP    [] Progressed/Changed HEP based on:   [] positioning   [] body mechanics   [] transfers   [] heat/ice application        Pain Level (0-10 scale) post treatment: 0    ASSESSMENT/Changes in Function: Session began with an active warm up via recumbent bicycle. Continued with POC addressing lumbar strength and stability exercises. Introduced SL clams and hip abduction with reports of muscle fatigue. STS performed from standard chair. Pt was cued for core engagement throughout today's session. Plan to continue POC progressing as able next visit.     Patient will continue to benefit from skilled PT services to modify and progress therapeutic interventions, analyze and address functional mobility deficits, analyze and address ROM deficits, analyze and address strength deficits, analyze and address soft tissue restrictions, analyze and cue for proper movement patterns, analyze and modify for postural abnormalities, and analyze and address imbalance/dizziness to attain remaining goals.     [x]  See Plan of Care  []  See progress note/recertification  []  See Discharge Summary       PLAN  [x]  Upgrade activities as tolerated     [x]  Continue plan of care  []  Update interventions per flow sheet       []  Discharge due to:_  []  Other:_      NINO Enriquez  11/27/2024  11:29 AM

## 2024-12-04 ENCOUNTER — HOSPITAL ENCOUNTER (OUTPATIENT)
Age: 64
Setting detail: RECURRING SERIES
Discharge: HOME | End: 2024-12-07
Payer: COMMERCIAL

## 2024-12-04 PROCEDURE — 97110 THERAPEUTIC EXERCISES: CPT

## 2024-12-04 NOTE — PROGRESS NOTES
PT DAILY TREATMENT NOTE     Patient Name: Ruba Santiago  Date:2024  : 1960  [x]  Patient  Verified  Payor: KATHY / Plan: [employer] / Product Type: *No Product type* /    In time:826  Out time:917  Total Treatment Time (min): 51  Total Timed Codes (min):51  1:1 Treatment Time (min): 34   Visit #: 5 of 11    Treatment Area: Low back pain, unspecified [M54.50]  Other intervertebral disc degeneration, lumbar region with discogenic back pain and lower extremity pain [M51.362]    SUBJECTIVE  Pt reports compliance with HEP. She states she continues to feel weakness in B LE. Chief complaint is stiffness in lower back.     Pain Level (0-10 scale): 3    Any medication changes, allergies to medications, adverse drug reactions, diagnosis change, or new procedure performed?: [x] No    [] Yes (see summary sheet for update)    OBJECTIVE    49 min Therapeutic Exercise:  [] See flow sheet :   Rationale: increase ROM, increase strength, improve coordination, improve balance, and increase proprioception to improve the patient’s ability to return to PLOF with full AROM and strength     With TE  TA   NR  GT   Misc Patient Education: [x] Review HEP    [] Progressed/Changed HEP based on:   [] positioning   [] body mechanics   [] transfers   [] heat/ice application        Pain Level (0-10 scale) post treatment: 0    ASSESSMENT/Changes in Function: Session began with an active warm up with MHP followed by supine stretches. Focused on CKC exercises to increase B LE strength and stability. Introduced squats, forward and lateral steps ups with no adverse effects. Revisited straight leg-raises and SL abduction increasing repetitions. Pt reports muscle fatigue upon completion, denying pain. Plan to continue POC as able next visit.     Patient will continue to benefit from skilled PT services to modify and progress therapeutic interventions, analyze and address functional mobility deficits,

## 2024-12-06 ENCOUNTER — HOSPITAL ENCOUNTER (OUTPATIENT)
Age: 64
Setting detail: RECURRING SERIES
Discharge: HOME | End: 2024-12-09
Payer: COMMERCIAL

## 2024-12-06 PROCEDURE — 97110 THERAPEUTIC EXERCISES: CPT

## 2024-12-06 NOTE — PROGRESS NOTES
free.      With TE  TA   NR  GT   Misc Patient Education: [x] Review HEP    [] Progressed/Changed HEP based on:   [] positioning   [] body mechanics   [] transfers   [] heat/ice application        Pain Level (0-10 scale) post treatment: 2    ASSESSMENT/Changes in Function: Session began with an active warm up with MHP followed by supine stretches. Focused on CKC exercises to increase B LE strength and stability. Revisited squats, forward and lateral steps ups with no adverse effects. Resistance was increased with straight leg-raises and SL abduction challenging strength and muscular endurance. Introduced supine quad stretch to decrease reported tightness. Pt reports muscle fatigue upon completion, denying pain. Plan to continue POC as able next visit.     Patient will continue to benefit from skilled PT services to modify and progress therapeutic interventions, analyze and address functional mobility deficits, analyze and address ROM deficits, analyze and address strength deficits, analyze and address soft tissue restrictions, analyze and cue for proper movement patterns, analyze and modify for postural abnormalities, and analyze and address imbalance/dizziness to attain remaining goals.     [x]  See Plan of Care  []  See progress note/recertification  []  See Discharge Summary       PLAN  [x]  Upgrade activities as tolerated     [x]  Continue plan of care  []  Update interventions per flow sheet       []  Discharge due to:_  []  Other:_      NINO Enriquez  12/6/2024  10:10 AM

## 2024-12-09 ENCOUNTER — HOSPITAL ENCOUNTER (OUTPATIENT)
Age: 64
Setting detail: RECURRING SERIES
Discharge: HOME | End: 2024-12-12
Payer: COMMERCIAL

## 2024-12-09 PROCEDURE — 97110 THERAPEUTIC EXERCISES: CPT

## 2024-12-09 NOTE — PROGRESS NOTES
PT DAILY TREATMENT NOTE 8-    Patient Name: Ruba Santiago  Date:2024  : 1960  [x]  Patient  Verified  Payor: KATHY / Plan: [employer] / Product Type: *No Product type* /    In time:1034  Out time:1134  Total Treatment Time (min): 60  Total Timed Codes (min): 50  1:1 Treatment Time (min): 50   Visit # 7      Treatment Area: Low back pain, unspecified [M54.50]  Other intervertebral disc degeneration, lumbar region with discogenic back pain and lower extremity pain [M51.362]    SUBJECTIVE  Pt without new complaints, states that LLE continues to feel weak. Pt to follow up with surgeon next on 24.  Pain Level (0-10 scale): 3/10  Any medication changes, allergies to medications, adverse drug reactions, diagnosis change, or new procedure performed?: [x] No    [] Yes (see summary sheet for update)        OBJECTIVE  Modality rationale: decrease inflammation, decrease pain, and increase tissue extensibility to improve the patient’s ability to move & heal optimally   Min Type Additional Details    [] Estim: []Att   []Unatt  []TENS instruct                 []IFC  []Premod []NMES                       []Other:  []w/US   []w/ice   []w/heat  Position:  Location:    []  Traction: [] Cervical       []Lumbar                       [] Prone          []Supine                       []Intermittent   []Continuous Lbs:  [] before manual  [] after manual    []  Ultrasound: []Continuous   [] Pulsed                           []1MHz   []3MHz Location:  W/cm2:    []  Iontophoresis with dexamethasone         Location: [] Take home patch   [] In clinic   10 [x]  Ice  (post)   [x]  Heat (with stepper)  []  Ice massage Position: sitting  Location: lumbar    []  Vasopneumatic Device Pressure: [] lo [] med [] hi   Temp: [] lo [] med [] hi   [x] Skin assessment post-treatment:  [x]intact []redness- no adverse reaction       []redness - adverse reaction:           50 min Therapeutic Exercise:  [x] See

## 2024-12-12 ENCOUNTER — HOSPITAL ENCOUNTER (OUTPATIENT)
Age: 64
Setting detail: RECURRING SERIES
End: 2024-12-12
Payer: COMMERCIAL

## 2024-12-18 ENCOUNTER — HOSPITAL ENCOUNTER (OUTPATIENT)
Age: 64
Setting detail: RECURRING SERIES
Discharge: HOME | End: 2024-12-21
Payer: COMMERCIAL

## 2024-12-18 PROCEDURE — 97110 THERAPEUTIC EXERCISES: CPT

## 2024-12-18 NOTE — PROGRESS NOTES
PT DAILY TREATMENT NOTE 814    Patient Name: Ruba Santiago  Date:2024  : 1960  [x]  Patient  Verified  Payor: KATHY / Plan: [employer] / Product Type: *No Product type* /    In time:8:58  Out time:9:33  Total Treatment Time (min): 35  Total Timed Codes (min): 35  1:1 Treatment Time (min): 35   Visit # 8 of 11      Treatment Area: Low back pain, unspecified [M54.50]  Other intervertebral disc degeneration, lumbar region with discogenic back pain and lower extremity pain [M51.362]    SUBJECTIVE  Pt reports to clinic with no complaints. She states that she is doing about 85-90% better. She has f/u with MD on 24.  Pain Level (0-10 scale): 0/10  Any medication changes, allergies to medications, adverse drug reactions, diagnosis change, or new procedure performed?: [x] No    [] Yes (see summary sheet for update)        OBJECTIVE    35 min Therapeutic Exercise:  [x] See flow sheet :   Rationale: increase ROM and increase strength to improve the patient’s ability to perform ADLs          With TE  TA   NR  GT   Misc Patient Education: [x] Review HEP    [] Progressed/Changed HEP based on:   [] positioning   [] body mechanics   [] transfers   [] heat/ice application          Pain Level (0-10 scale) post treatment: 0/10    ASSESSMENT/Changes in Function: see d/c note     []  See Plan of Care  []  See progress note/recertification  [x]  See Discharge Summary             PLAN  []  Upgrade activities as tolerated     []  Continue plan of care  []  Update interventions per flow sheet       [x]  Discharge due to: functional independence  []  Other:_      Parrish Traore, PT, DPT, CSCS 2024  8:55 AM

## 2024-12-18 NOTE — THERAPY DISCHARGE
[facility] REHABILITATION  PHYSICAL THERAPY  [address] [facility], [address] * Phone: [phone] * Fax: [phone]  DISCHARGE SUMMARY FOR PHYSICAL THERAPY            Patient Name: Ruba Santiago : 1960   Treatment/Medical Diagnosis: Low back pain, unspecified [M54.50]  Other intervertebral disc degeneration, lumbar region with discogenic back pain and lower extremity pain [M51.362]   Onset Date: 24    Referral Source: Solitario Dong MD Start of Care (SOC): 11/15/24   Prior Hospitalization: See Medical History Provider #: [id number]   Prior Level of Function: Independent   Comorbidities: Past Medical History:   Diagnosis Date    Arthritis     Lumbar radiculopathy     Rheumatoid arthritis (HCC)         Medications: Verified on Patient Summary List   Visits from SOC: 8 Missed Visits: 0       Subjective:  Pt reports to clinic with no complaints. She states that she is doing about 85-90% better. She has f/u with MD on 24.      Objective:    Palpation: no TTP noted, incision appears to be healing well     Posture:  Lateral Shift:    [] R    [] L     [] +  [] -  Kyphosis:        [] Increased   [] Decreased   []  WNL  Lordosis:         [] Increased   [] Decreased   [] WNL  Pelvic symmetry: [x] WNL      [] Other:           Active Movements: [] N/A   [x] Too acute   [] Other:  Thoracolumbar ROM  AROM PROM Comments:pain, area   Forward flexion          Extension          SB right          SB left          Rotation right          Rotation left                Neuro Screen:  Myotomes:  L1,2- Hip flexion:                     [] Normal [x] Diminished    MMT: 4/5 L  L3,4- Knee extension:             [x] Normal [] Diminished    MMT:   L4- Ankle dorsiflexion:            [x] Normal [] Diminished    MMT:  S1- Ankle plantarflexion:         [] Normal [] Diminished    MMT:  S1,2- Knee flexion:                 [x] Normal [] Diminished    MMT:   Comments:     Dermatomes:  L2: [x]

## 2025-04-24 ENCOUNTER — HOSPITAL ENCOUNTER (OUTPATIENT)
Facility: HOSPITAL | Age: 65
End: 2025-04-24
Payer: COMMERCIAL

## 2025-04-24 VITALS
HEART RATE: 64 BPM | TEMPERATURE: 98.4 F | DIASTOLIC BLOOD PRESSURE: 75 MMHG | WEIGHT: 139.7 LBS | RESPIRATION RATE: 20 BRPM | OXYGEN SATURATION: 100 % | SYSTOLIC BLOOD PRESSURE: 137 MMHG | BODY MASS INDEX: 23.98 KG/M2

## 2025-04-24 DIAGNOSIS — M19.041 PRIMARY OSTEOARTHRITIS OF BOTH HANDS: Primary | ICD-10-CM

## 2025-04-24 DIAGNOSIS — M19.042 PRIMARY OSTEOARTHRITIS OF BOTH HANDS: Primary | ICD-10-CM

## 2025-04-24 PROCEDURE — 99202 OFFICE O/P NEW SF 15 MIN: CPT

## 2025-04-24 NOTE — PROGRESS NOTES
restrictions  Constitutional: Pleasant, sitting comfortably in no acute distress.   HEENT: Moist mucous membranes.  Cardiac: Good peripheral perfusion, no upper or lower extremity edema bilaterally.  Pulmonary: No increased work of breathing. Symmetric chest rise  MSK: Multiple finger Joints enlarged and stiff.  L thumb interphalangeal joint completely immobile/fused.    Skin: Warm, dry, no rashes noted.  Neurologic: Alert and oriented.  Psychiatric: Cooperative to commands and responds to questions appropriately.    Imaging   Imaging reports were reviewed as detailed in her history above    Assessment & Plan   Ms. Santiago is a 64 y.o. female with a history of arthritis of hands for many years.    There may be a component of rheumatoid arthritis in the hands as well but with individual joints swelling and hurting more than others there is likely an osteoarthritic component.    We had a discussion regarding risks/benefits/logistics/rationale for radiation to osteoarthritic hands. I cited about 60-70% rate of pain relief in patients with joint OA receiving low dose radiation.  I discussed with her that there is almost zero risk of side effects from the treatment.  Patient is amenable to treatment and signed consent and all her questions were answered.    In conclusion, after thorough discussion and shared decision making with the patient, we have formulated the following treatment plan:   LDRT to bilateral hands to 3 Gy in 6 fx.    Thank you for the opportunity to participate in the care of Ms. Santiago. Please feel free to contact me with any questions or concerns.    Silvestre Owen MD  Radiation Oncology  [address]  [address] [place] [id number] [phone]    Time and Counseling: I spent a total of 45 minutes in care of this patient during today's encounter on 4/24/25.  This includes time spent preparing to see the patient, performing the exam, counseling the patient and family,

## 2025-05-09 ENCOUNTER — HOSPITAL ENCOUNTER (OUTPATIENT)
Facility: HOSPITAL | Age: 65
Discharge: HOME | End: 2025-05-12
Attending: STUDENT IN AN ORGANIZED HEALTH CARE EDUCATION/TRAINING PROGRAM
Payer: COMMERCIAL

## 2025-05-09 PROCEDURE — 77300 RADIATION THERAPY DOSE PLAN: CPT

## 2025-05-12 ENCOUNTER — HOSPITAL ENCOUNTER (OUTPATIENT)
Facility: HOSPITAL | Age: 65
Discharge: HOME | End: 2025-05-15
Attending: STUDENT IN AN ORGANIZED HEALTH CARE EDUCATION/TRAINING PROGRAM
Payer: COMMERCIAL

## 2025-05-12 ENCOUNTER — APPOINTMENT (OUTPATIENT)
Facility: HOSPITAL | Age: 65
End: 2025-05-12
Attending: STUDENT IN AN ORGANIZED HEALTH CARE EDUCATION/TRAINING PROGRAM
Payer: COMMERCIAL

## 2025-05-12 VITALS — WEIGHT: 137.1 LBS | BODY MASS INDEX: 23.53 KG/M2

## 2025-05-12 DIAGNOSIS — M19.041 PRIMARY OSTEOARTHRITIS OF BOTH HANDS: Primary | ICD-10-CM

## 2025-05-12 DIAGNOSIS — M19.042 PRIMARY OSTEOARTHRITIS OF BOTH HANDS: Primary | ICD-10-CM

## 2025-05-12 LAB
RAD ONC ARIA COURSE FIRST TREATMENT DATE: NORMAL
RAD ONC ARIA COURSE ID: NORMAL
RAD ONC ARIA COURSE INTENT: NORMAL
RAD ONC ARIA COURSE LAST TREATMENT DATE: NORMAL
RAD ONC ARIA COURSE SESSION NUMBER: 1
RAD ONC ARIA COURSE START DATE: NORMAL
RAD ONC ARIA COURSE TREATMENT ELAPSED DAYS: 0
RAD ONC ARIA PLAN FRACTIONS TREATED TO DATE: 1
RAD ONC ARIA PLAN FRACTIONS TREATED TO DATE: 1
RAD ONC ARIA PLAN ID: NORMAL
RAD ONC ARIA PLAN ID: NORMAL
RAD ONC ARIA PLAN PRESCRIBED DOSE PER FRACTION: 0.5 GY
RAD ONC ARIA PLAN PRESCRIBED DOSE PER FRACTION: 0.5 GY
RAD ONC ARIA PLAN PRIMARY REFERENCE POINT: NORMAL
RAD ONC ARIA PLAN PRIMARY REFERENCE POINT: NORMAL
RAD ONC ARIA PLAN TOTAL FRACTIONS PRESCRIBED: 6
RAD ONC ARIA PLAN TOTAL FRACTIONS PRESCRIBED: 6
RAD ONC ARIA PLAN TOTAL PRESCRIBED DOSE: 300 CGY
RAD ONC ARIA PLAN TOTAL PRESCRIBED DOSE: 300 CGY
RAD ONC ARIA REFERENCE POINT DOSAGE GIVEN TO DATE: 0.5 GY
RAD ONC ARIA REFERENCE POINT DOSAGE GIVEN TO DATE: 0.5 GY
RAD ONC ARIA REFERENCE POINT ID: NORMAL
RAD ONC ARIA REFERENCE POINT ID: NORMAL
RAD ONC ARIA REFERENCE POINT SESSION DOSAGE GIVEN: 0.5 GY
RAD ONC ARIA REFERENCE POINT SESSION DOSAGE GIVEN: 0.5 GY

## 2025-05-12 ASSESSMENT — PATIENT HEALTH QUESTIONNAIRE - PHQ9
SUM OF ALL RESPONSES TO PHQ QUESTIONS 1-9: 0
SUM OF ALL RESPONSES TO PHQ QUESTIONS 1-9: 0
1. LITTLE INTEREST OR PLEASURE IN DOING THINGS: NOT AT ALL
SUM OF ALL RESPONSES TO PHQ QUESTIONS 1-9: 0
2. FEELING DOWN, DEPRESSED OR HOPELESS: NOT AT ALL
SUM OF ALL RESPONSES TO PHQ QUESTIONS 1-9: 0

## 2025-05-12 ASSESSMENT — PAIN DESCRIPTION - PAIN TYPE: TYPE: CHRONIC PAIN

## 2025-05-12 ASSESSMENT — PAIN DESCRIPTION - LOCATION: LOCATION: FINGER (COMMENT WHICH ONE)

## 2025-05-12 ASSESSMENT — PAIN SCALES - GENERAL: PAINLEVEL_OUTOF10: 5

## 2025-05-12 NOTE — PROGRESS NOTES
tablet by mouth 3 times daily as needed for Pain. Patient states she typically takes twice daily.  One in the morning when she wakes up and one around 2 pm. Max Daily Amount: 150 mg, Disp: , Rfl:     ketorolac (TORADOL) 10 MG tablet, Take 1 tablet by mouth every 8 hours as needed for Pain, Disp: 15 tablet, Rfl: 0    buPROPion (WELLBUTRIN XL) 150 MG extended release tablet, Take 1 tablet by mouth every morning, Disp: , Rfl:     methocarbamol (ROBAXIN) 500 MG tablet, Take 1 tablet by mouth 4 times daily as needed, Disp: , Rfl:     rosuvastatin (CRESTOR) 5 MG tablet, Take 4 tablets by mouth nightly, Disp: , Rfl:     traZODone (DESYREL) 50 MG tablet, Take 1 tablet by mouth nightly as needed for Sleep, Disp: , Rfl:       ASSESSMENT & PLAN:   - Continue radiation treatment as planned  - Treatment setup and plan reviewed. Port images/CBCT images reviewed. Appropriate laboratory work was reviewed.   - Treatment side effects and toxicities reviewed with the patient, and appropriate management was advised.   5/12: cont xrt.    Silvestre Owen MD  Radiation Oncology  [facility]

## 2025-05-14 ENCOUNTER — APPOINTMENT (OUTPATIENT)
Facility: HOSPITAL | Age: 65
End: 2025-05-14
Attending: STUDENT IN AN ORGANIZED HEALTH CARE EDUCATION/TRAINING PROGRAM
Payer: COMMERCIAL

## 2025-05-16 ENCOUNTER — APPOINTMENT (OUTPATIENT)
Facility: HOSPITAL | Age: 65
End: 2025-05-16
Attending: STUDENT IN AN ORGANIZED HEALTH CARE EDUCATION/TRAINING PROGRAM
Payer: COMMERCIAL

## 2025-05-19 ENCOUNTER — APPOINTMENT (OUTPATIENT)
Facility: HOSPITAL | Age: 65
End: 2025-05-19
Attending: STUDENT IN AN ORGANIZED HEALTH CARE EDUCATION/TRAINING PROGRAM
Payer: COMMERCIAL

## 2025-05-21 ENCOUNTER — APPOINTMENT (OUTPATIENT)
Facility: HOSPITAL | Age: 65
End: 2025-05-21
Attending: STUDENT IN AN ORGANIZED HEALTH CARE EDUCATION/TRAINING PROGRAM
Payer: COMMERCIAL

## 2025-05-23 ENCOUNTER — HOSPITAL ENCOUNTER (OUTPATIENT)
Facility: HOSPITAL | Age: 65
Discharge: HOME | End: 2025-05-26
Attending: STUDENT IN AN ORGANIZED HEALTH CARE EDUCATION/TRAINING PROGRAM
Payer: COMMERCIAL

## 2025-05-23 ENCOUNTER — APPOINTMENT (OUTPATIENT)
Facility: HOSPITAL | Age: 65
End: 2025-05-23
Attending: STUDENT IN AN ORGANIZED HEALTH CARE EDUCATION/TRAINING PROGRAM
Payer: COMMERCIAL

## 2025-05-23 LAB
RAD ONC ARIA COURSE FIRST TREATMENT DATE: NORMAL
RAD ONC ARIA COURSE ID: NORMAL
RAD ONC ARIA COURSE INTENT: NORMAL
RAD ONC ARIA COURSE LAST TREATMENT DATE: NORMAL
RAD ONC ARIA COURSE SESSION NUMBER: 2
RAD ONC ARIA COURSE START DATE: NORMAL
RAD ONC ARIA COURSE TREATMENT ELAPSED DAYS: 11
RAD ONC ARIA PLAN FRACTIONS TREATED TO DATE: 2
RAD ONC ARIA PLAN FRACTIONS TREATED TO DATE: 2
RAD ONC ARIA PLAN ID: NORMAL
RAD ONC ARIA PLAN ID: NORMAL
RAD ONC ARIA PLAN PRESCRIBED DOSE PER FRACTION: 0.5 GY
RAD ONC ARIA PLAN PRESCRIBED DOSE PER FRACTION: 0.5 GY
RAD ONC ARIA PLAN PRIMARY REFERENCE POINT: NORMAL
RAD ONC ARIA PLAN PRIMARY REFERENCE POINT: NORMAL
RAD ONC ARIA PLAN TOTAL FRACTIONS PRESCRIBED: 6
RAD ONC ARIA PLAN TOTAL FRACTIONS PRESCRIBED: 6
RAD ONC ARIA PLAN TOTAL PRESCRIBED DOSE: 300 CGY
RAD ONC ARIA PLAN TOTAL PRESCRIBED DOSE: 300 CGY
RAD ONC ARIA REFERENCE POINT DOSAGE GIVEN TO DATE: 1 GY
RAD ONC ARIA REFERENCE POINT DOSAGE GIVEN TO DATE: 1 GY
RAD ONC ARIA REFERENCE POINT ID: NORMAL
RAD ONC ARIA REFERENCE POINT ID: NORMAL
RAD ONC ARIA REFERENCE POINT SESSION DOSAGE GIVEN: 0.5 GY
RAD ONC ARIA REFERENCE POINT SESSION DOSAGE GIVEN: 0.5 GY

## 2025-05-23 PROCEDURE — 77412 RADIATION TX DELIVERY LVL 3: CPT

## 2025-05-27 ENCOUNTER — HOSPITAL ENCOUNTER (OUTPATIENT)
Facility: HOSPITAL | Age: 65
Discharge: HOME | End: 2025-05-30
Attending: STUDENT IN AN ORGANIZED HEALTH CARE EDUCATION/TRAINING PROGRAM
Payer: COMMERCIAL

## 2025-05-27 VITALS — BODY MASS INDEX: 24.03 KG/M2 | WEIGHT: 140 LBS

## 2025-05-27 DIAGNOSIS — M19.042 PRIMARY OSTEOARTHRITIS OF BOTH HANDS: Primary | ICD-10-CM

## 2025-05-27 DIAGNOSIS — M19.041 PRIMARY OSTEOARTHRITIS OF BOTH HANDS: Primary | ICD-10-CM

## 2025-05-27 LAB
RAD ONC ARIA COURSE FIRST TREATMENT DATE: NORMAL
RAD ONC ARIA COURSE ID: NORMAL
RAD ONC ARIA COURSE INTENT: NORMAL
RAD ONC ARIA COURSE LAST TREATMENT DATE: NORMAL
RAD ONC ARIA COURSE SESSION NUMBER: 3
RAD ONC ARIA COURSE START DATE: NORMAL
RAD ONC ARIA COURSE TREATMENT ELAPSED DAYS: 15
RAD ONC ARIA PLAN FRACTIONS TREATED TO DATE: 3
RAD ONC ARIA PLAN FRACTIONS TREATED TO DATE: 3
RAD ONC ARIA PLAN ID: NORMAL
RAD ONC ARIA PLAN ID: NORMAL
RAD ONC ARIA PLAN PRESCRIBED DOSE PER FRACTION: 0.5 GY
RAD ONC ARIA PLAN PRESCRIBED DOSE PER FRACTION: 0.5 GY
RAD ONC ARIA PLAN PRIMARY REFERENCE POINT: NORMAL
RAD ONC ARIA PLAN PRIMARY REFERENCE POINT: NORMAL
RAD ONC ARIA PLAN TOTAL FRACTIONS PRESCRIBED: 6
RAD ONC ARIA PLAN TOTAL FRACTIONS PRESCRIBED: 6
RAD ONC ARIA PLAN TOTAL PRESCRIBED DOSE: 300 CGY
RAD ONC ARIA PLAN TOTAL PRESCRIBED DOSE: 300 CGY
RAD ONC ARIA REFERENCE POINT DOSAGE GIVEN TO DATE: 1.5 GY
RAD ONC ARIA REFERENCE POINT DOSAGE GIVEN TO DATE: 1.5 GY
RAD ONC ARIA REFERENCE POINT ID: NORMAL
RAD ONC ARIA REFERENCE POINT ID: NORMAL
RAD ONC ARIA REFERENCE POINT SESSION DOSAGE GIVEN: 0.5 GY
RAD ONC ARIA REFERENCE POINT SESSION DOSAGE GIVEN: 0.5 GY

## 2025-05-27 PROCEDURE — 77412 RADIATION TX DELIVERY LVL 3: CPT

## 2025-05-27 ASSESSMENT — PAIN DESCRIPTION - ORIENTATION: ORIENTATION: LEFT;RIGHT

## 2025-05-27 ASSESSMENT — PAIN SCALES - GENERAL: PAINLEVEL_OUTOF10: 7

## 2025-05-27 ASSESSMENT — PAIN DESCRIPTION - LOCATION: LOCATION: HAND

## 2025-05-27 NOTE — PROGRESS NOTES
Cancer [place] at [facility]      RADIATION ONCOLOGY WEEKLY PROGRESS NOTE    Encounter Date: 05/27/25   Patient Name: Ruba Santiago  YOB: 1960  Medical Record Number: [id number]    DIAGNOSIS:       ICD-10-CM    1. Primary osteoarthritis of both hands  M19.041     M19.042         STAGING:    Cancer Staging   No matching staging information was found for the patient.    AJCC Staging has been reviewed    ASSESSMENT:   Bilateral hand osteoarthritis    TREATMENT DETAILS:   Total current Radiation Dose: 150 cGy    No concurrent systemic therapy    SUBJECTIVE   Ms. Santiago is a 64 y.o. female seen today for her weekly on-treatment evaluation    5/12/25:  First OTV, at fraction 1.  No new complaints.  Reports pain is worst in R ring finger.  Went over RT schedule and answered questions.  5/27: no change in pain    OBJECTIVE/PHYSICAL EXAM:   VITAL SIGNS: There were no vitals taken for this visit.  Wt Readings from Last 5 Encounters:   05/27/25 63.5 kg (140 lb)   05/12/25 62.2 kg (137 lb 1.6 oz)   04/24/25 63.4 kg (139 lb 11.2 oz)   09/17/24 63 kg (139 lb)   09/12/24 63 kg (139 lb)          Performance status:  ECOG 0, fully active, able to carry on all predisease activities without restrictions  General: Alert and conversant, in NAD  Skin: no changes,   Hands: multiple swollen, tender joints  5/27: no changes on exam    LABS:  Personally reviewed    MEDICATIONS:    Current Outpatient Medications:     gabapentin (NEURONTIN) 300 MG capsule, Take 2 capsules by mouth 2 times daily. Max Daily Amount: 1,200 mg, Disp: , Rfl:     cyclobenzaprine (FLEXERIL) 10 MG tablet, Take 1 tablet by mouth 2 times daily as needed for Muscle spasms, Disp: , Rfl:     HYDROmorphone (DILAUDID) 2 MG tablet, Take 1 tablet by mouth every 3 hours as needed for Pain. Max Daily Amount: 16 mg, Disp: , Rfl:     oxyCODONE-acetaminophen (PERCOCET)  MG per tablet, Take 1 tablet by mouth every 4 hours as needed for

## 2025-05-28 ENCOUNTER — HOSPITAL ENCOUNTER (OUTPATIENT)
Facility: HOSPITAL | Age: 65
Discharge: HOME | End: 2025-05-31
Attending: STUDENT IN AN ORGANIZED HEALTH CARE EDUCATION/TRAINING PROGRAM
Payer: COMMERCIAL

## 2025-05-28 LAB
RAD ONC ARIA COURSE FIRST TREATMENT DATE: NORMAL
RAD ONC ARIA COURSE ID: NORMAL
RAD ONC ARIA COURSE INTENT: NORMAL
RAD ONC ARIA COURSE LAST TREATMENT DATE: NORMAL
RAD ONC ARIA COURSE SESSION NUMBER: 4
RAD ONC ARIA COURSE START DATE: NORMAL
RAD ONC ARIA COURSE TREATMENT ELAPSED DAYS: 16
RAD ONC ARIA PLAN FRACTIONS TREATED TO DATE: 1
RAD ONC ARIA PLAN FRACTIONS TREATED TO DATE: 4
RAD ONC ARIA PLAN ID: NORMAL
RAD ONC ARIA PLAN ID: NORMAL
RAD ONC ARIA PLAN PRESCRIBED DOSE PER FRACTION: 0.5 GY
RAD ONC ARIA PLAN PRESCRIBED DOSE PER FRACTION: 0.5 GY
RAD ONC ARIA PLAN PRIMARY REFERENCE POINT: NORMAL
RAD ONC ARIA PLAN PRIMARY REFERENCE POINT: NORMAL
RAD ONC ARIA PLAN TOTAL FRACTIONS PRESCRIBED: 3
RAD ONC ARIA PLAN TOTAL FRACTIONS PRESCRIBED: 6
RAD ONC ARIA PLAN TOTAL PRESCRIBED DOSE: 150 CGY
RAD ONC ARIA PLAN TOTAL PRESCRIBED DOSE: 300 CGY
RAD ONC ARIA REFERENCE POINT DOSAGE GIVEN TO DATE: 2 GY
RAD ONC ARIA REFERENCE POINT DOSAGE GIVEN TO DATE: 2 GY
RAD ONC ARIA REFERENCE POINT ID: NORMAL
RAD ONC ARIA REFERENCE POINT ID: NORMAL
RAD ONC ARIA REFERENCE POINT SESSION DOSAGE GIVEN: 0.5 GY
RAD ONC ARIA REFERENCE POINT SESSION DOSAGE GIVEN: 0.5 GY

## 2025-05-28 PROCEDURE — 77412 RADIATION TX DELIVERY LVL 3: CPT

## 2025-06-02 ENCOUNTER — APPOINTMENT (OUTPATIENT)
Facility: HOSPITAL | Age: 65
End: 2025-06-02
Attending: STUDENT IN AN ORGANIZED HEALTH CARE EDUCATION/TRAINING PROGRAM
Payer: COMMERCIAL

## 2025-06-03 ENCOUNTER — HOSPITAL ENCOUNTER (OUTPATIENT)
Facility: HOSPITAL | Age: 65
Discharge: HOME | End: 2025-06-06
Attending: STUDENT IN AN ORGANIZED HEALTH CARE EDUCATION/TRAINING PROGRAM
Payer: COMMERCIAL

## 2025-06-03 VITALS — WEIGHT: 137.6 LBS | BODY MASS INDEX: 23.62 KG/M2

## 2025-06-03 DIAGNOSIS — M19.041 PRIMARY OSTEOARTHRITIS OF BOTH HANDS: Primary | ICD-10-CM

## 2025-06-03 DIAGNOSIS — M19.042 PRIMARY OSTEOARTHRITIS OF BOTH HANDS: Primary | ICD-10-CM

## 2025-06-03 LAB
RAD ONC ARIA COURSE FIRST TREATMENT DATE: NORMAL
RAD ONC ARIA COURSE ID: NORMAL
RAD ONC ARIA COURSE INTENT: NORMAL
RAD ONC ARIA COURSE LAST TREATMENT DATE: NORMAL
RAD ONC ARIA COURSE SESSION NUMBER: 5
RAD ONC ARIA COURSE START DATE: NORMAL
RAD ONC ARIA COURSE TREATMENT ELAPSED DAYS: 22
RAD ONC ARIA PLAN FRACTIONS TREATED TO DATE: 2
RAD ONC ARIA PLAN FRACTIONS TREATED TO DATE: 5
RAD ONC ARIA PLAN ID: NORMAL
RAD ONC ARIA PLAN ID: NORMAL
RAD ONC ARIA PLAN PRESCRIBED DOSE PER FRACTION: 0.5 GY
RAD ONC ARIA PLAN PRESCRIBED DOSE PER FRACTION: 0.5 GY
RAD ONC ARIA PLAN PRIMARY REFERENCE POINT: NORMAL
RAD ONC ARIA PLAN PRIMARY REFERENCE POINT: NORMAL
RAD ONC ARIA PLAN TOTAL FRACTIONS PRESCRIBED: 3
RAD ONC ARIA PLAN TOTAL FRACTIONS PRESCRIBED: 6
RAD ONC ARIA PLAN TOTAL PRESCRIBED DOSE: 150 CGY
RAD ONC ARIA PLAN TOTAL PRESCRIBED DOSE: 300 CGY
RAD ONC ARIA REFERENCE POINT DOSAGE GIVEN TO DATE: 2.5 GY
RAD ONC ARIA REFERENCE POINT DOSAGE GIVEN TO DATE: 2.5 GY
RAD ONC ARIA REFERENCE POINT ID: NORMAL
RAD ONC ARIA REFERENCE POINT ID: NORMAL
RAD ONC ARIA REFERENCE POINT SESSION DOSAGE GIVEN: 0.5 GY
RAD ONC ARIA REFERENCE POINT SESSION DOSAGE GIVEN: 0.5 GY

## 2025-06-03 PROCEDURE — 77412 RADIATION TX DELIVERY LVL 3: CPT

## 2025-06-03 ASSESSMENT — PAIN DESCRIPTION - LOCATION: LOCATION: HAND

## 2025-06-03 ASSESSMENT — PAIN DESCRIPTION - ORIENTATION: ORIENTATION: LEFT

## 2025-06-03 NOTE — PROGRESS NOTES
Cancer [place] at [facility]      RADIATION ONCOLOGY WEEKLY PROGRESS NOTE    Encounter Date: 06/03/25   Patient Name: Ruba Santiago  YOB: 1960  Medical Record Number: [id number]    DIAGNOSIS:       ICD-10-CM    1. Primary osteoarthritis of both hands  M19.041     M19.042         STAGING:    Cancer Staging   No matching staging information was found for the patient.    AJCC Staging has been reviewed    ASSESSMENT:   Bilateral hand osteoarthritis    TREATMENT DETAILS:   Total current Radiation Dose: 250 cGy    No concurrent systemic therapy    SUBJECTIVE   Ms. Santiago is a 64 y.o. female seen today for her weekly on-treatment evaluation    5/12/25:  First OTV, at fraction 1.  No new complaints.  Reports pain is worst in R ring finger.  Went over RT schedule and answered questions.  5/27: no change in pain  6/2: left hand pain improved, R hand stable    OBJECTIVE/PHYSICAL EXAM:   VITAL SIGNS: There were no vitals taken for this visit.  Wt Readings from Last 5 Encounters:   06/03/25 62.4 kg (137 lb 9.6 oz)   05/27/25 63.5 kg (140 lb)   05/12/25 62.2 kg (137 lb 1.6 oz)   04/24/25 63.4 kg (139 lb 11.2 oz)   09/17/24 63 kg (139 lb)          Performance status:  ECOG 0, fully active, able to carry on all predisease activities without restrictions  General: Alert and conversant, in NAD  Skin: no changes,   Hands: multiple swollen, tender joints  5/27: no changes on exam  6/2: no changes on exam    LABS:  Personally reviewed    MEDICATIONS:    Current Outpatient Medications:     gabapentin (NEURONTIN) 300 MG capsule, Take 2 capsules by mouth 2 times daily. Max Daily Amount: 1,200 mg, Disp: , Rfl:     cyclobenzaprine (FLEXERIL) 10 MG tablet, Take 1 tablet by mouth 2 times daily as needed for Muscle spasms, Disp: , Rfl:     HYDROmorphone (DILAUDID) 2 MG tablet, Take 1 tablet by mouth every 3 hours as needed for Pain. Max Daily Amount: 16 mg, Disp: , Rfl:     oxyCODONE-acetaminophen

## 2025-06-05 ENCOUNTER — HOSPITAL ENCOUNTER (OUTPATIENT)
Facility: HOSPITAL | Age: 65
Discharge: HOME | End: 2025-06-08
Attending: STUDENT IN AN ORGANIZED HEALTH CARE EDUCATION/TRAINING PROGRAM
Payer: COMMERCIAL

## 2025-06-05 ENCOUNTER — CLINICAL DOCUMENTATION (OUTPATIENT)
Facility: HOSPITAL | Age: 65
End: 2025-06-05

## 2025-06-05 LAB
RAD ONC ARIA COURSE FIRST TREATMENT DATE: NORMAL
RAD ONC ARIA COURSE ID: NORMAL
RAD ONC ARIA COURSE INTENT: NORMAL
RAD ONC ARIA COURSE LAST TREATMENT DATE: NORMAL
RAD ONC ARIA COURSE SESSION NUMBER: 6
RAD ONC ARIA COURSE START DATE: NORMAL
RAD ONC ARIA COURSE TREATMENT ELAPSED DAYS: 24
RAD ONC ARIA PLAN FRACTIONS TREATED TO DATE: 3
RAD ONC ARIA PLAN FRACTIONS TREATED TO DATE: 6
RAD ONC ARIA PLAN ID: NORMAL
RAD ONC ARIA PLAN ID: NORMAL
RAD ONC ARIA PLAN PRESCRIBED DOSE PER FRACTION: 0.5 GY
RAD ONC ARIA PLAN PRESCRIBED DOSE PER FRACTION: 0.5 GY
RAD ONC ARIA PLAN PRIMARY REFERENCE POINT: NORMAL
RAD ONC ARIA PLAN PRIMARY REFERENCE POINT: NORMAL
RAD ONC ARIA PLAN TOTAL FRACTIONS PRESCRIBED: 3
RAD ONC ARIA PLAN TOTAL FRACTIONS PRESCRIBED: 6
RAD ONC ARIA PLAN TOTAL PRESCRIBED DOSE: 150 CGY
RAD ONC ARIA PLAN TOTAL PRESCRIBED DOSE: 300 CGY
RAD ONC ARIA REFERENCE POINT DOSAGE GIVEN TO DATE: 3 GY
RAD ONC ARIA REFERENCE POINT DOSAGE GIVEN TO DATE: 3 GY
RAD ONC ARIA REFERENCE POINT ID: NORMAL
RAD ONC ARIA REFERENCE POINT ID: NORMAL
RAD ONC ARIA REFERENCE POINT SESSION DOSAGE GIVEN: 0.5 GY
RAD ONC ARIA REFERENCE POINT SESSION DOSAGE GIVEN: 0.5 GY

## 2025-06-05 PROCEDURE — 77336 RADIATION PHYSICS CONSULT: CPT

## 2025-06-05 PROCEDURE — 77412 RADIATION TX DELIVERY LVL 3: CPT

## 2025-06-05 NOTE — PROGRESS NOTES
[place], [place]      RADIATION ONCOLOGY CLINICAL END OF TREATMENT NOTE    Encounter Date: 06/05/25   Patient Name: Ruba Santiago  YOB: 1960  Medical Record Number: [id number]    DIAGNOSIS:   No diagnosis found.  STAGING:   Cancer Staging   No matching staging information was found for the patient.    AJCC Staging has been reviewed    ASSESSMENT:   Bilateral hand osteoarthritis    TREATMENT SUMMARY:     Treatment Site Modality Dose/Fx (cGy) #Fx Total Dose (cGy) Start Date End Date Elapsed Days   Left and right hand 3D-CRT 50 6 300 5/12/25 6/5/25 24                 CONCURRENT THERAPY: None    TREATMENT RESPONSE:  Treatment completed as planned      MEDICATIONS:     Current Outpatient Medications:     gabapentin (NEURONTIN) 300 MG capsule, Take 2 capsules by mouth 2 times daily. Max Daily Amount: 1,200 mg, Disp: , Rfl:     cyclobenzaprine (FLEXERIL) 10 MG tablet, Take 1 tablet by mouth 2 times daily as needed for Muscle spasms, Disp: , Rfl:     HYDROmorphone (DILAUDID) 2 MG tablet, Take 1 tablet by mouth every 3 hours as needed for Pain. Max Daily Amount: 16 mg, Disp: , Rfl:     oxyCODONE-acetaminophen (PERCOCET)  MG per tablet, Take 1 tablet by mouth every 4 hours as needed for Pain. Max Daily Amount: 6 tablets, Disp: , Rfl:     traMADol (ULTRAM) 50 MG tablet, Take 1 tablet by mouth 3 times daily as needed for Pain. Patient states she typically takes twice daily.  One in the morning when she wakes up and one around 2 pm. Max Daily Amount: 150 mg, Disp: , Rfl:     ketorolac (TORADOL) 10 MG tablet, Take 1 tablet by mouth every 8 hours as needed for Pain, Disp: 15 tablet, Rfl: 0    buPROPion (WELLBUTRIN XL) 150 MG extended release tablet, Take 1 tablet by mouth every morning, Disp: , Rfl:     methocarbamol (ROBAXIN) 500 MG tablet, Take 1 tablet by mouth 4 times daily as needed, Disp: , Rfl:     rosuvastatin (CRESTOR) 5 MG tablet, Take 4 tablets by mouth nightly,

## 2025-07-08 ENCOUNTER — APPOINTMENT (OUTPATIENT)
Facility: HOSPITAL | Age: 65
End: 2025-07-08
Attending: STUDENT IN AN ORGANIZED HEALTH CARE EDUCATION/TRAINING PROGRAM
Payer: COMMERCIAL

## 2025-07-16 ENCOUNTER — HOSPITAL ENCOUNTER (OUTPATIENT)
Facility: HOSPITAL | Age: 65
Discharge: HOME | End: 2025-07-19
Attending: STUDENT IN AN ORGANIZED HEALTH CARE EDUCATION/TRAINING PROGRAM
Payer: COMMERCIAL

## 2025-07-16 VITALS
BODY MASS INDEX: 23.14 KG/M2 | TEMPERATURE: 97.3 F | SYSTOLIC BLOOD PRESSURE: 139 MMHG | DIASTOLIC BLOOD PRESSURE: 86 MMHG | HEART RATE: 104 BPM | WEIGHT: 134.8 LBS | RESPIRATION RATE: 18 BRPM | OXYGEN SATURATION: 100 %

## 2025-07-16 DIAGNOSIS — M19.042 PRIMARY OSTEOARTHRITIS OF BOTH HANDS: Primary | ICD-10-CM

## 2025-07-16 DIAGNOSIS — M19.041 PRIMARY OSTEOARTHRITIS OF BOTH HANDS: Primary | ICD-10-CM

## 2025-07-16 PROCEDURE — 99212 OFFICE O/P EST SF 10 MIN: CPT

## 2025-07-16 NOTE — PROGRESS NOTES
[facility]    Radiation Oncology Follow Up    Ruba Santiago  [id number]  1960     Diagnosis   Bilateral hand osteoarthritis    AJCC Staging has been reviewed.  Oncologic History   Received 3 Gy in 6 fx to each hand finished 6/5/25    Interval History   Ms. Santiago arrives today for routine follow up 6 weeks from end of treatment.    She reports minimal pain relief in the left hand but no change in the right hand.  She denies any other new joint pains or complaints at this time.    Review of Systems:  A complete review of systems was obtained and negative except as described above.    Interval Imaging/Labs   No new imaging to review.    Allergies and Medications     Allergies   Allergen Reactions    Bee Venom Anaphylaxis       Current Outpatient Medications   Medication Instructions    buPROPion (WELLBUTRIN XL) 150 mg, Oral, EVERY MORNING    cyclobenzaprine (FLEXERIL) 10 mg, Oral, 2 TIMES DAILY PRN    gabapentin (NEURONTIN) 600 mg, Oral, 2 TIMES DAILY    HYDROmorphone (DILAUDID) 2 mg, Oral, EVERY 3 HOURS PRN    ketorolac (TORADOL) 10 mg, Oral, EVERY 8 HOURS PRN    methocarbamol (ROBAXIN) 500 mg, Oral, 4 TIMES DAILY PRN    oxyCODONE-acetaminophen (PERCOCET)  MG per tablet 1 tablet, Oral, EVERY 4 HOURS PRN    rosuvastatin (CRESTOR) 20 mg, Oral, EVERY BEDTIME    traMADol (ULTRAM) 50 mg, Oral, 3 TIMES DAILY PRN, Patient states she typically takes twice daily.  One in the morning when she wakes up and one around 2 pm.    traZODone (DESYREL) 50 mg, Oral, NIGHTLY PRN        Physical Exam:   Vitals: There were no vitals taken for this visit.   Performance Status: ECOG 0, fully active, able to carry on all predisease activities without restrictions  Constitutional: Pleasant, sitting comfortably in no acute distress.   HEENT: Moist mucous membranes.  Cardiac: Good peripheral perfusion, no upper or lower extremity edema bilaterally.  Pulmonary: No increased work of

## 2025-07-16 NOTE — ADDENDUM NOTE
Encounter addended by: Kiran Gallego RN on: 7/16/2025 9:26 AM   Actions taken: Medication List reviewed, Flowsheet accepted